# Patient Record
Sex: FEMALE | Race: WHITE | NOT HISPANIC OR LATINO
[De-identification: names, ages, dates, MRNs, and addresses within clinical notes are randomized per-mention and may not be internally consistent; named-entity substitution may affect disease eponyms.]

---

## 2018-03-14 ENCOUNTER — APPOINTMENT (OUTPATIENT)
Dept: INTERNAL MEDICINE | Facility: CLINIC | Age: 68
End: 2018-03-14
Payer: MEDICARE

## 2018-03-14 VITALS
HEIGHT: 61 IN | HEART RATE: 86 BPM | DIASTOLIC BLOOD PRESSURE: 60 MMHG | SYSTOLIC BLOOD PRESSURE: 140 MMHG | RESPIRATION RATE: 14 BRPM | WEIGHT: 177 LBS | TEMPERATURE: 97.7 F | OXYGEN SATURATION: 96 % | BODY MASS INDEX: 33.42 KG/M2

## 2018-03-14 DIAGNOSIS — Z87.09 PERSONAL HISTORY OF OTHER DISEASES OF THE RESPIRATORY SYSTEM: ICD-10-CM

## 2018-03-14 DIAGNOSIS — R73.03 PREDIABETES.: ICD-10-CM

## 2018-03-14 DIAGNOSIS — M79.89 OTHER SPECIFIED SOFT TISSUE DISORDERS: ICD-10-CM

## 2018-03-14 DIAGNOSIS — Z78.9 OTHER SPECIFIED HEALTH STATUS: ICD-10-CM

## 2018-03-14 PROCEDURE — 99204 OFFICE O/P NEW MOD 45 MIN: CPT

## 2018-03-14 RX ORDER — VITAMIN K2 90 MCG
125 MCG CAPSULE ORAL
Refills: 0 | Status: ACTIVE | COMMUNITY

## 2018-03-14 RX ORDER — MULTIVITAMIN
TABLET ORAL
Refills: 0 | Status: ACTIVE | COMMUNITY

## 2018-05-02 ENCOUNTER — RECORD ABSTRACTING (OUTPATIENT)
Age: 68
End: 2018-05-02

## 2018-05-02 DIAGNOSIS — Z86.69 PERSONAL HISTORY OF OTHER DISEASES OF THE NERVOUS SYSTEM AND SENSE ORGANS: ICD-10-CM

## 2018-05-02 DIAGNOSIS — Z87.42 PERSONAL HISTORY OF OTHER DISEASES OF THE FEMALE GENITAL TRACT: ICD-10-CM

## 2018-05-02 DIAGNOSIS — Z87.19 PERSONAL HISTORY OF OTHER DISEASES OF THE DIGESTIVE SYSTEM: ICD-10-CM

## 2018-05-02 DIAGNOSIS — Z80.0 FAMILY HISTORY OF MALIGNANT NEOPLASM OF DIGESTIVE ORGANS: ICD-10-CM

## 2018-05-02 DIAGNOSIS — K46.9 UNSPECIFIED ABDOMINAL HERNIA W/OUT OBSTRUCTION OR GANGRENE: ICD-10-CM

## 2018-05-08 ENCOUNTER — APPOINTMENT (OUTPATIENT)
Dept: GYNECOLOGIC ONCOLOGY | Facility: CLINIC | Age: 68
End: 2018-05-08
Payer: MEDICARE

## 2018-05-08 VITALS
HEART RATE: 85 BPM | WEIGHT: 117 LBS | BODY MASS INDEX: 22.09 KG/M2 | DIASTOLIC BLOOD PRESSURE: 77 MMHG | HEIGHT: 61 IN | SYSTOLIC BLOOD PRESSURE: 127 MMHG | OXYGEN SATURATION: 97 %

## 2018-05-08 DIAGNOSIS — Z80.3 FAMILY HISTORY OF MALIGNANT NEOPLASM OF BREAST: ICD-10-CM

## 2018-05-08 PROCEDURE — 99205 OFFICE O/P NEW HI 60 MIN: CPT

## 2018-05-08 RX ORDER — SENNOSIDES 8.6 MG
TABLET ORAL
Refills: 0 | Status: ACTIVE | COMMUNITY

## 2018-05-21 ENCOUNTER — OUTPATIENT (OUTPATIENT)
Dept: OUTPATIENT SERVICES | Facility: HOSPITAL | Age: 68
LOS: 1 days | End: 2018-05-21
Payer: MEDICARE

## 2018-05-21 ENCOUNTER — RESULT REVIEW (OUTPATIENT)
Age: 68
End: 2018-05-21

## 2018-05-21 DIAGNOSIS — C54.1 MALIGNANT NEOPLASM OF ENDOMETRIUM: ICD-10-CM

## 2018-05-21 PROCEDURE — 88321 CONSLTJ&REPRT SLD PREP ELSWR: CPT

## 2018-05-27 ENCOUNTER — TRANSCRIPTION ENCOUNTER (OUTPATIENT)
Age: 68
End: 2018-05-27

## 2018-05-30 LAB — SURGICAL PATHOLOGY STUDY: SIGNIFICANT CHANGE UP

## 2018-07-05 ENCOUNTER — OUTPATIENT (OUTPATIENT)
Dept: OUTPATIENT SERVICES | Facility: HOSPITAL | Age: 68
LOS: 1 days | End: 2018-07-05
Payer: MEDICARE

## 2018-07-05 ENCOUNTER — APPOINTMENT (OUTPATIENT)
Dept: CT IMAGING | Facility: CLINIC | Age: 68
End: 2018-07-05

## 2018-07-05 DIAGNOSIS — Z00.8 ENCOUNTER FOR OTHER GENERAL EXAMINATION: ICD-10-CM

## 2018-07-05 PROCEDURE — 74177 CT ABD & PELVIS W/CONTRAST: CPT | Mod: 26

## 2018-07-05 PROCEDURE — 74177 CT ABD & PELVIS W/CONTRAST: CPT

## 2018-07-12 ENCOUNTER — RECORD ABSTRACTING (OUTPATIENT)
Age: 68
End: 2018-07-12

## 2018-07-12 DIAGNOSIS — Z86.2 PERSONAL HISTORY OF DISEASES OF THE BLOOD AND BLOOD-FORMING ORGANS AND CERTAIN DISORDERS INVOLVING THE IMMUNE MECHANISM: ICD-10-CM

## 2018-07-12 DIAGNOSIS — Z81.8 FAMILY HISTORY OF OTHER MENTAL AND BEHAVIORAL DISORDERS: ICD-10-CM

## 2018-07-13 ENCOUNTER — APPOINTMENT (OUTPATIENT)
Dept: PEDIATRIC MEDICAL GENETICS | Facility: CLINIC | Age: 68
End: 2018-07-13
Payer: MEDICARE

## 2018-07-13 PROCEDURE — 96040: CPT

## 2018-07-25 ENCOUNTER — RECORD ABSTRACTING (OUTPATIENT)
Age: 68
End: 2018-07-25

## 2018-07-25 ENCOUNTER — APPOINTMENT (OUTPATIENT)
Dept: PULMONOLOGY | Facility: CLINIC | Age: 68
End: 2018-07-25

## 2018-08-06 ENCOUNTER — APPOINTMENT (OUTPATIENT)
Dept: PEDIATRIC MEDICAL GENETICS | Facility: CLINIC | Age: 68
End: 2018-08-06
Payer: MEDICARE

## 2018-08-06 PROCEDURE — 96040: CPT

## 2018-08-15 ENCOUNTER — APPOINTMENT (OUTPATIENT)
Dept: PULMONOLOGY | Facility: CLINIC | Age: 68
End: 2018-08-15
Payer: MEDICARE

## 2018-08-15 VITALS
RESPIRATION RATE: 14 BRPM | HEART RATE: 80 BPM | OXYGEN SATURATION: 94 % | SYSTOLIC BLOOD PRESSURE: 152 MMHG | HEIGHT: 61 IN | DIASTOLIC BLOOD PRESSURE: 79 MMHG

## 2018-08-15 PROCEDURE — 94010 BREATHING CAPACITY TEST: CPT

## 2018-08-15 PROCEDURE — 99213 OFFICE O/P EST LOW 20 MIN: CPT | Mod: 25

## 2018-08-15 PROCEDURE — 95012 NITRIC OXIDE EXP GAS DETER: CPT

## 2018-08-15 RX ORDER — MONTELUKAST SODIUM 10 MG/1
10 TABLET, FILM COATED ORAL
Refills: 0 | Status: DISCONTINUED | COMMUNITY
End: 2018-08-15

## 2018-08-15 RX ORDER — BECLOMETHASONE DIPROPIONATE 80 UG/1
AEROSOL, METERED RESPIRATORY (INHALATION)
Refills: 0 | Status: DISCONTINUED | COMMUNITY
End: 2018-08-15

## 2018-08-28 ENCOUNTER — APPOINTMENT (OUTPATIENT)
Dept: GYNECOLOGIC ONCOLOGY | Facility: CLINIC | Age: 68
End: 2018-08-28
Payer: MEDICARE

## 2018-08-28 ENCOUNTER — RECORD ABSTRACTING (OUTPATIENT)
Age: 68
End: 2018-08-28

## 2018-08-28 VITALS
BODY MASS INDEX: 22.09 KG/M2 | DIASTOLIC BLOOD PRESSURE: 75 MMHG | OXYGEN SATURATION: 96 % | HEART RATE: 78 BPM | HEIGHT: 61 IN | SYSTOLIC BLOOD PRESSURE: 135 MMHG | WEIGHT: 117 LBS

## 2018-08-28 PROCEDURE — 99213 OFFICE O/P EST LOW 20 MIN: CPT

## 2018-08-28 RX ORDER — BECLOMETHASONE DIPROPIONATE HFA 80 UG/1
AEROSOL, METERED RESPIRATORY (INHALATION)
Refills: 0 | Status: DISCONTINUED | COMMUNITY
End: 2018-08-28

## 2018-08-28 RX ORDER — BIOTIN 5 MG
CAPSULE ORAL
Refills: 0 | Status: DISCONTINUED | COMMUNITY
End: 2018-08-28

## 2018-08-28 RX ORDER — OXYCODONE HYDROCHLORIDE AND ACETAMINOPHEN 5; 325 MG/1; MG/1
5-325 TABLET ORAL
Refills: 0 | Status: DISCONTINUED | COMMUNITY
End: 2018-08-28

## 2018-09-25 ENCOUNTER — FORM ENCOUNTER (OUTPATIENT)
Age: 68
End: 2018-09-25

## 2018-09-26 ENCOUNTER — APPOINTMENT (OUTPATIENT)
Dept: MRI IMAGING | Facility: CLINIC | Age: 68
End: 2018-09-26

## 2018-09-26 ENCOUNTER — OUTPATIENT (OUTPATIENT)
Dept: OUTPATIENT SERVICES | Facility: HOSPITAL | Age: 68
LOS: 1 days | End: 2018-09-26
Payer: MEDICARE

## 2018-09-26 DIAGNOSIS — Z00.8 ENCOUNTER FOR OTHER GENERAL EXAMINATION: ICD-10-CM

## 2018-09-26 PROCEDURE — 72148 MRI LUMBAR SPINE W/O DYE: CPT

## 2018-09-26 PROCEDURE — 72148 MRI LUMBAR SPINE W/O DYE: CPT | Mod: 26

## 2018-10-20 ENCOUNTER — OUTPATIENT (OUTPATIENT)
Dept: OUTPATIENT SERVICES | Facility: HOSPITAL | Age: 68
LOS: 1 days | End: 2018-10-20
Payer: MEDICARE

## 2018-10-20 ENCOUNTER — APPOINTMENT (OUTPATIENT)
Dept: CT IMAGING | Facility: CLINIC | Age: 68
End: 2018-10-20
Payer: MEDICARE

## 2018-10-20 DIAGNOSIS — C55 MALIGNANT NEOPLASM OF UTERUS, PART UNSPECIFIED: ICD-10-CM

## 2018-10-20 PROCEDURE — 74177 CT ABD & PELVIS W/CONTRAST: CPT

## 2018-10-20 PROCEDURE — 82565 ASSAY OF CREATININE: CPT

## 2018-10-20 PROCEDURE — 74177 CT ABD & PELVIS W/CONTRAST: CPT | Mod: 26

## 2018-11-11 ENCOUNTER — FORM ENCOUNTER (OUTPATIENT)
Age: 68
End: 2018-11-11

## 2018-11-11 DIAGNOSIS — J45.20 MILD INTERMITTENT ASTHMA, UNCOMPLICATED: ICD-10-CM

## 2018-11-12 ENCOUNTER — APPOINTMENT (OUTPATIENT)
Dept: CT IMAGING | Facility: CLINIC | Age: 68
End: 2018-11-12
Payer: MEDICARE

## 2018-11-12 ENCOUNTER — OUTPATIENT (OUTPATIENT)
Dept: OUTPATIENT SERVICES | Facility: HOSPITAL | Age: 68
LOS: 1 days | End: 2018-11-12
Payer: MEDICARE

## 2018-11-12 DIAGNOSIS — R91.1 SOLITARY PULMONARY NODULE: ICD-10-CM

## 2018-11-12 PROCEDURE — 71250 CT THORAX DX C-: CPT

## 2018-11-12 PROCEDURE — 71250 CT THORAX DX C-: CPT | Mod: 26

## 2018-11-13 PROBLEM — J45.20 ALLERGIC ASTHMA, MILD INTERMITTENT, UNCOMPLICATED: Status: RESOLVED | Noted: 2018-07-12 | Resolved: 2018-11-13

## 2018-11-14 ENCOUNTER — APPOINTMENT (OUTPATIENT)
Dept: PULMONOLOGY | Facility: CLINIC | Age: 68
End: 2018-11-14
Payer: MEDICARE

## 2018-11-14 VITALS
BODY MASS INDEX: 31.72 KG/M2 | DIASTOLIC BLOOD PRESSURE: 71 MMHG | OXYGEN SATURATION: 97 % | HEIGHT: 61 IN | HEART RATE: 88 BPM | SYSTOLIC BLOOD PRESSURE: 147 MMHG | WEIGHT: 168 LBS | RESPIRATION RATE: 14 BRPM

## 2018-11-14 PROCEDURE — 99213 OFFICE O/P EST LOW 20 MIN: CPT | Mod: 25

## 2018-11-14 PROCEDURE — 95012 NITRIC OXIDE EXP GAS DETER: CPT

## 2018-11-14 PROCEDURE — 94010 BREATHING CAPACITY TEST: CPT

## 2018-12-04 ENCOUNTER — APPOINTMENT (OUTPATIENT)
Dept: GYNECOLOGIC ONCOLOGY | Facility: CLINIC | Age: 68
End: 2018-12-04
Payer: MEDICARE

## 2018-12-04 VITALS
BODY MASS INDEX: 31.72 KG/M2 | HEIGHT: 61 IN | WEIGHT: 168 LBS | DIASTOLIC BLOOD PRESSURE: 80 MMHG | SYSTOLIC BLOOD PRESSURE: 126 MMHG | HEART RATE: 76 BPM

## 2018-12-04 DIAGNOSIS — R91.1 SOLITARY PULMONARY NODULE: ICD-10-CM

## 2018-12-04 PROCEDURE — 99213 OFFICE O/P EST LOW 20 MIN: CPT

## 2019-01-02 ENCOUNTER — APPOINTMENT (OUTPATIENT)
Dept: INTERNAL MEDICINE | Facility: CLINIC | Age: 69
End: 2019-01-02
Payer: MEDICARE

## 2019-01-02 ENCOUNTER — LABORATORY RESULT (OUTPATIENT)
Age: 69
End: 2019-01-02

## 2019-01-02 VITALS
TEMPERATURE: 98.2 F | WEIGHT: 166 LBS | BODY MASS INDEX: 31.34 KG/M2 | DIASTOLIC BLOOD PRESSURE: 70 MMHG | RESPIRATION RATE: 14 BRPM | OXYGEN SATURATION: 97 % | SYSTOLIC BLOOD PRESSURE: 130 MMHG | HEART RATE: 86 BPM | HEIGHT: 61 IN

## 2019-01-02 PROCEDURE — G0439: CPT

## 2019-01-02 NOTE — HEALTH RISK ASSESSMENT
[Fair] : ~his/her~ current health as fair  [Good] : ~his/her~  mood as  good [0] : 2) Feeling down, depressed, or hopeless: Not at all (0) [Patient reported mammogram was normal] : Patient reported mammogram was normal [MammogramDate] : 03/18 [ColonoscopyDate] : 01/15 [ColonoscopyComments] : polyp

## 2019-01-02 NOTE — ASSESSMENT
[FreeTextEntry1] : Chronic pain- follow up with pain management\par Asthma- follow up with pulmonary\par HM- UT\par Anxiety- take Lorazepam sparingly

## 2019-01-02 NOTE — HISTORY OF PRESENT ILLNESS
[de-identified] : Pt with chronic pain- followed by pain management\par Pt followed by GynOnc for adenocarcinoma uterine.\par Pt followed by GI for pancreatic cyst\par Pt followed by pulmonary for asthma\par only some slight anxiety- take Lorazepam only sparingly

## 2019-01-04 LAB
25(OH)D3 SERPL-MCNC: 39.8 NG/ML
ALBUMIN SERPL ELPH-MCNC: 4.2 G/DL
ALP BLD-CCNC: 85 U/L
ALT SERPL-CCNC: 12 U/L
ANION GAP SERPL CALC-SCNC: 12 MMOL/L
AST SERPL-CCNC: 20 U/L
BASOPHILS # BLD AUTO: 0.05 K/UL
BASOPHILS NFR BLD AUTO: 0.7 %
BILIRUB SERPL-MCNC: 0.6 MG/DL
BUN SERPL-MCNC: 17 MG/DL
CALCIUM SERPL-MCNC: 9.4 MG/DL
CHLORIDE SERPL-SCNC: 105 MMOL/L
CHOLEST SERPL-MCNC: 181 MG/DL
CHOLEST/HDLC SERPL: 2.7 RATIO
CO2 SERPL-SCNC: 25 MMOL/L
CREAT SERPL-MCNC: 0.67 MG/DL
EOSINOPHIL # BLD AUTO: 0.03 K/UL
EOSINOPHIL NFR BLD AUTO: 0.4 %
FOLATE SERPL-MCNC: >20 NG/ML
GLUCOSE SERPL-MCNC: 96 MG/DL
HBA1C MFR BLD HPLC: 5.7 %
HCT VFR BLD CALC: 36.5 %
HDLC SERPL-MCNC: 67 MG/DL
HGB BLD-MCNC: 11.3 G/DL
IMM GRANULOCYTES NFR BLD AUTO: 0.3 %
LDLC SERPL CALC-MCNC: 97 MG/DL
LYMPHOCYTES # BLD AUTO: 1.82 K/UL
LYMPHOCYTES NFR BLD AUTO: 26.6 %
MAN DIFF?: NORMAL
MCHC RBC-ENTMCNC: 18.7 PG
MCHC RBC-ENTMCNC: 31 GM/DL
MCV RBC AUTO: 60.3 FL
MONOCYTES # BLD AUTO: 0.39 K/UL
MONOCYTES NFR BLD AUTO: 5.7 %
NEUTROPHILS # BLD AUTO: 4.53 K/UL
NEUTROPHILS NFR BLD AUTO: 66.3 %
PLATELET # BLD AUTO: 285 K/UL
POTASSIUM SERPL-SCNC: 4.8 MMOL/L
PROT SERPL-MCNC: 6.9 G/DL
RBC # BLD: 6.05 M/UL
RBC # FLD: 16.4 %
SODIUM SERPL-SCNC: 142 MMOL/L
TRIGL SERPL-MCNC: 85 MG/DL
TSH SERPL-ACNC: 1.7 UIU/ML
VIT B12 SERPL-MCNC: 786 PG/ML
WBC # FLD AUTO: 6.84 K/UL

## 2019-01-07 ENCOUNTER — APPOINTMENT (OUTPATIENT)
Dept: HEMATOLOGY ONCOLOGY | Facility: CLINIC | Age: 69
End: 2019-01-07

## 2019-01-07 LAB
APPEARANCE: ABNORMAL
BACTERIA: ABNORMAL
BILIRUBIN URINE: NEGATIVE
BLOOD URINE: NEGATIVE
COLOR: YELLOW
GLUCOSE QUALITATIVE U: NEGATIVE MG/DL
HYALINE CASTS: 1 /LPF
KETONES URINE: NEGATIVE
LEUKOCYTE ESTERASE URINE: NEGATIVE
MICROSCOPIC-UA: NORMAL
NITRITE URINE: POSITIVE
PH URINE: >8.5
PROTEIN URINE: NEGATIVE MG/DL
RED BLOOD CELLS URINE: 4 /HPF
SPECIFIC GRAVITY URINE: 1.02
SQUAMOUS EPITHELIAL CELLS: 2 /HPF
UROBILINOGEN URINE: NEGATIVE MG/DL
WHITE BLOOD CELLS URINE: 1 /HPF

## 2019-01-12 LAB — HEMOCCULT STL QL IA: NEGATIVE

## 2019-02-13 ENCOUNTER — LABORATORY RESULT (OUTPATIENT)
Age: 69
End: 2019-02-13

## 2019-02-13 ENCOUNTER — APPOINTMENT (OUTPATIENT)
Dept: INTERNAL MEDICINE | Facility: CLINIC | Age: 69
End: 2019-02-13
Payer: MEDICARE

## 2019-02-13 VITALS
OXYGEN SATURATION: 98 % | WEIGHT: 160 LBS | SYSTOLIC BLOOD PRESSURE: 140 MMHG | RESPIRATION RATE: 14 BRPM | BODY MASS INDEX: 30.21 KG/M2 | HEIGHT: 61 IN | HEART RATE: 72 BPM | DIASTOLIC BLOOD PRESSURE: 70 MMHG | TEMPERATURE: 98.3 F

## 2019-02-13 DIAGNOSIS — R63.4 ABNORMAL WEIGHT LOSS: ICD-10-CM

## 2019-02-13 PROCEDURE — 99214 OFFICE O/P EST MOD 30 MIN: CPT

## 2019-02-13 NOTE — ASSESSMENT
[FreeTextEntry1] : anxiety- to see therapist for evaluation\par Weight loss- order MRI of abdomen with and without contrast - follow up with GI

## 2019-02-13 NOTE — HISTORY OF PRESENT ILLNESS
[de-identified] : Pt here today \par Has recently lost 16 pounds weight loss in the past 6 months.\par Pt with poor appetite.\par No back pain\par Pt followed by GI for pancreas cyst. Schedule to get MRI of abdomen in 4/19\par Pt very anxious lately especially worse in the move

## 2019-02-13 NOTE — REVIEW OF SYSTEMS
[Negative] : Heme/Lymph [Suicidal] : not suicidal [Insomnia] : no insomnia [Anxiety] : anxiety [Depression] : depression

## 2019-02-14 ENCOUNTER — APPOINTMENT (OUTPATIENT)
Dept: INTERNAL MEDICINE | Facility: CLINIC | Age: 69
End: 2019-02-14

## 2019-02-14 LAB
ALBUMIN SERPL ELPH-MCNC: 4.5 G/DL
ALP BLD-CCNC: 86 U/L
ALT SERPL-CCNC: 15 U/L
AMYLASE/CREAT SERPL: 41 U/L
ANION GAP SERPL CALC-SCNC: 9 MMOL/L
AST SERPL-CCNC: 17 U/L
BASOPHILS # BLD AUTO: 0.04 K/UL
BASOPHILS NFR BLD AUTO: 0.6 %
BILIRUB SERPL-MCNC: 0.6 MG/DL
BUN SERPL-MCNC: 14 MG/DL
CALCIUM SERPL-MCNC: 9.8 MG/DL
CANCER AG19-9 SERPL-ACNC: 24.2 U/ML
CHLORIDE SERPL-SCNC: 102 MMOL/L
CO2 SERPL-SCNC: 28 MMOL/L
CREAT SERPL-MCNC: 0.67 MG/DL
EOSINOPHIL # BLD AUTO: 0.01 K/UL
EOSINOPHIL NFR BLD AUTO: 0.2 %
GLUCOSE SERPL-MCNC: 100 MG/DL
HCT VFR BLD CALC: 36.1 %
HGB BLD-MCNC: 11.1 G/DL
IMM GRANULOCYTES NFR BLD AUTO: 0.5 %
LPL SERPL-CCNC: 27 U/L
LYMPHOCYTES # BLD AUTO: 1.72 K/UL
LYMPHOCYTES NFR BLD AUTO: 26.2 %
MAN DIFF?: NORMAL
MCHC RBC-ENTMCNC: 18.3 PG
MCHC RBC-ENTMCNC: 30.7 GM/DL
MCV RBC AUTO: 59.6 FL
MONOCYTES # BLD AUTO: 0.38 K/UL
MONOCYTES NFR BLD AUTO: 5.8 %
NEUTROPHILS # BLD AUTO: 4.38 K/UL
NEUTROPHILS NFR BLD AUTO: 66.7 %
PLATELET # BLD AUTO: 275 K/UL
POTASSIUM SERPL-SCNC: 4.8 MMOL/L
PROT SERPL-MCNC: 7.1 G/DL
RBC # BLD: 6.06 M/UL
RBC # FLD: 16.5 %
SODIUM SERPL-SCNC: 139 MMOL/L
WBC # FLD AUTO: 6.56 K/UL

## 2019-02-20 ENCOUNTER — APPOINTMENT (OUTPATIENT)
Dept: PULMONOLOGY | Facility: CLINIC | Age: 69
End: 2019-02-20
Payer: MEDICARE

## 2019-02-20 VITALS
HEART RATE: 60 BPM | OXYGEN SATURATION: 95 % | HEIGHT: 61 IN | DIASTOLIC BLOOD PRESSURE: 77 MMHG | SYSTOLIC BLOOD PRESSURE: 161 MMHG | WEIGHT: 160 LBS | BODY MASS INDEX: 30.21 KG/M2

## 2019-02-20 PROCEDURE — 99213 OFFICE O/P EST LOW 20 MIN: CPT | Mod: 25

## 2019-02-20 PROCEDURE — 95012 NITRIC OXIDE EXP GAS DETER: CPT

## 2019-02-20 RX ORDER — CICLESONIDE 80 UG/1
80 AEROSOL, METERED RESPIRATORY (INHALATION)
Qty: 15 | Refills: 1 | Status: DISCONTINUED | COMMUNITY
Start: 2018-08-15 | End: 2019-02-20

## 2019-02-20 NOTE — ASSESSMENT
[FreeTextEntry1] : Symptomatic asthma medication intolerance. Experiencing side effects from very low doses and very low potency inhaled steroids. Can retry alvesco with spacer. We'll also add anticholinergic as p.r.n.\par \par Patient requesting retrial of montelukast with beta blockers. As side effect was cardiac reluctant to retry these medications. However if cannot control symptoms with inhalers may have no other option

## 2019-02-20 NOTE — HISTORY OF PRESENT ILLNESS
[FreeTextEntry1] : had issues with anxiety\par stopped qvar-claims to be losing weight from it\par reports improvement in anxiety without medication\par

## 2019-02-23 ENCOUNTER — FORM ENCOUNTER (OUTPATIENT)
Age: 69
End: 2019-02-23

## 2019-02-24 ENCOUNTER — APPOINTMENT (OUTPATIENT)
Dept: MRI IMAGING | Facility: CLINIC | Age: 69
End: 2019-02-24
Payer: MEDICARE

## 2019-02-24 ENCOUNTER — OUTPATIENT (OUTPATIENT)
Dept: OUTPATIENT SERVICES | Facility: HOSPITAL | Age: 69
LOS: 1 days | End: 2019-02-24
Payer: MEDICARE

## 2019-02-24 DIAGNOSIS — R63.4 ABNORMAL WEIGHT LOSS: ICD-10-CM

## 2019-02-24 PROCEDURE — 74183 MRI ABD W/O CNTR FLWD CNTR: CPT | Mod: 26

## 2019-02-24 PROCEDURE — 72197 MRI PELVIS W/O & W/DYE: CPT | Mod: 26

## 2019-02-24 PROCEDURE — A9585: CPT

## 2019-02-24 PROCEDURE — 72197 MRI PELVIS W/O & W/DYE: CPT

## 2019-02-24 PROCEDURE — 74183 MRI ABD W/O CNTR FLWD CNTR: CPT

## 2019-03-02 ENCOUNTER — TRANSCRIPTION ENCOUNTER (OUTPATIENT)
Age: 69
End: 2019-03-02

## 2019-03-04 ENCOUNTER — TRANSCRIPTION ENCOUNTER (OUTPATIENT)
Age: 69
End: 2019-03-04

## 2019-03-07 RX ORDER — SERTRALINE HYDROCHLORIDE 50 MG/1
50 TABLET, FILM COATED ORAL
Qty: 30 | Refills: 2 | Status: DISCONTINUED | COMMUNITY
Start: 2019-03-01 | End: 2019-03-07

## 2019-03-12 ENCOUNTER — TRANSCRIPTION ENCOUNTER (OUTPATIENT)
Age: 69
End: 2019-03-12

## 2019-03-12 ENCOUNTER — APPOINTMENT (OUTPATIENT)
Dept: GYNECOLOGIC ONCOLOGY | Facility: CLINIC | Age: 69
End: 2019-03-12
Payer: MEDICARE

## 2019-03-12 VITALS
DIASTOLIC BLOOD PRESSURE: 82 MMHG | BODY MASS INDEX: 30.21 KG/M2 | HEIGHT: 61 IN | WEIGHT: 160 LBS | SYSTOLIC BLOOD PRESSURE: 134 MMHG

## 2019-03-12 PROCEDURE — 99213 OFFICE O/P EST LOW 20 MIN: CPT

## 2019-03-13 ENCOUNTER — APPOINTMENT (OUTPATIENT)
Dept: INTERNAL MEDICINE | Facility: CLINIC | Age: 69
End: 2019-03-13

## 2019-03-14 ENCOUNTER — TRANSCRIPTION ENCOUNTER (OUTPATIENT)
Age: 69
End: 2019-03-14

## 2019-03-15 ENCOUNTER — TRANSCRIPTION ENCOUNTER (OUTPATIENT)
Age: 69
End: 2019-03-15

## 2019-03-25 ENCOUNTER — TRANSCRIPTION ENCOUNTER (OUTPATIENT)
Age: 69
End: 2019-03-25

## 2019-03-26 ENCOUNTER — APPOINTMENT (OUTPATIENT)
Dept: INTERNAL MEDICINE | Facility: CLINIC | Age: 69
End: 2019-03-26
Payer: MEDICARE

## 2019-03-26 VITALS
WEIGHT: 160 LBS | RESPIRATION RATE: 14 BRPM | HEART RATE: 90 BPM | TEMPERATURE: 98.7 F | HEIGHT: 61 IN | BODY MASS INDEX: 30.21 KG/M2 | DIASTOLIC BLOOD PRESSURE: 80 MMHG | OXYGEN SATURATION: 98 % | SYSTOLIC BLOOD PRESSURE: 140 MMHG

## 2019-03-26 PROCEDURE — 99213 OFFICE O/P EST LOW 20 MIN: CPT

## 2019-03-26 NOTE — HISTORY OF PRESENT ILLNESS
[de-identified] : Pt here today for blood pressure check.\par Pt with more panic recently because dog \par Pt with blood pressure readings 110-140/80\par Saw GI this am for pancreatic cyst\par Pt referred to urology for lesion on kidney with patient

## 2019-03-26 NOTE — ASSESSMENT
[FreeTextEntry1] : Borderline BP- recheck next week\par Stressed diet and exercise.\par Recheck next week.

## 2019-04-02 ENCOUNTER — APPOINTMENT (OUTPATIENT)
Dept: INTERNAL MEDICINE | Facility: CLINIC | Age: 69
End: 2019-04-02
Payer: MEDICARE

## 2019-04-02 VITALS
SYSTOLIC BLOOD PRESSURE: 150 MMHG | OXYGEN SATURATION: 96 % | RESPIRATION RATE: 14 BRPM | DIASTOLIC BLOOD PRESSURE: 70 MMHG | HEART RATE: 86 BPM | HEIGHT: 61 IN | WEIGHT: 165 LBS | BODY MASS INDEX: 31.15 KG/M2 | TEMPERATURE: 98.2 F

## 2019-04-02 VITALS — SYSTOLIC BLOOD PRESSURE: 132 MMHG | DIASTOLIC BLOOD PRESSURE: 80 MMHG

## 2019-04-02 PROCEDURE — 99213 OFFICE O/P EST LOW 20 MIN: CPT

## 2019-04-02 NOTE — HISTORY OF PRESENT ILLNESS
[de-identified] : Pt here today for blood pressure check.\par Pt monitoring blood pressure- only periodically elevated.\par

## 2019-04-11 ENCOUNTER — APPOINTMENT (OUTPATIENT)
Dept: CARDIOLOGY | Facility: CLINIC | Age: 69
End: 2019-04-11
Payer: MEDICARE

## 2019-04-11 ENCOUNTER — NON-APPOINTMENT (OUTPATIENT)
Age: 69
End: 2019-04-11

## 2019-04-11 VITALS
BODY MASS INDEX: 30.58 KG/M2 | HEIGHT: 61 IN | OXYGEN SATURATION: 97 % | DIASTOLIC BLOOD PRESSURE: 84 MMHG | SYSTOLIC BLOOD PRESSURE: 135 MMHG | HEART RATE: 69 BPM | WEIGHT: 162 LBS

## 2019-04-11 PROCEDURE — 99204 OFFICE O/P NEW MOD 45 MIN: CPT

## 2019-04-11 PROCEDURE — 93000 ELECTROCARDIOGRAM COMPLETE: CPT

## 2019-04-11 NOTE — HISTORY OF PRESENT ILLNESS
[FreeTextEntry1] : Ms. Chang is a 69 year old female here for initial evaluation.\par \par Cardiac-wise, she has a history of premature ventricular contractions and ventricular bigeminy. This usually occurs in the setting of steroid based asthma medications, which she is currently off of. In the past, she has been on Inderal when taking her medications.\par \par Recently, her blood pressures have been more elevated than usual. While she was on her asthma medications, her SBP was near the 140 range. Over the last month, her bps seem to be better controlled, except for a spike after playing poker.\par \par Overall, she is feeling well. She denies chest pain, difficulty breathing, and significant palpitations. She also denies lower trimmings swelling, orthopnea, PND, dizziness, lightheadedness and near syncope. She has seen a cardiologist in [place] in the past, and will have the records faxed to our office.

## 2019-04-11 NOTE — DISCUSSION/SUMMARY
[___ Month(s)] : [unfilled] month(s) [FreeTextEntry1] : Ms. Chang is a 69 year old female here for initial evaluation. \par \par It seems that she has a history of situational hypertension. She has previously been on Inderal, in the setting of premature ventricular contractions and bigeminy.\par \par Her blood pressure is better controlled today. Her EKG is a SR without obvious ischemia or chamber enlargement.  I will hold off on starting any medications at this time. If her PVCs continue, and her blood pressure remains elevated, I will add a beta blocker to her regimen.\par \par She will continue to monitor her blood pressure at home. I have asked that she fax her most recent records from her cardiologist in [place] to our office. I will then decide if any further testing is necessary.\par \par I have stressed the importance of diet, and exercise in detail. She knows to call if any issues or concerns. She will see me in 2 months time, after being evaluated by her pulmonologist.\par \par

## 2019-04-11 NOTE — PHYSICAL EXAM
[Normal Appearance] : normal appearance [General Appearance - Well Developed] : well developed [General Appearance - Well Nourished] : well nourished [Well Groomed] : well groomed [No Deformities] : no deformities [Eyelids - No Xanthelasma] : the eyelids demonstrated no xanthelasmas [Normal Conjunctiva] : the conjunctiva exhibited no abnormalities [General Appearance - In No Acute Distress] : no acute distress [Normal Oral Mucosa] : normal oral mucosa [No Oral Cyanosis] : no oral cyanosis [No Oral Pallor] : no oral pallor [Normal Jugular Venous A Waves Present] : normal jugular venous A waves present [Normal Jugular Venous V Waves Present] : normal jugular venous V waves present [No Jugular Venous Dumont A Waves] : no jugular venous dumont A waves [Normal Rate] : normal [Normal S1] : normal S1 [Normal S2] : normal S2 [No Murmur] : no murmurs heard [2+] : left 2+ [No Abnormalities] : the abdominal aorta was not enlarged and no bruit was heard [No Pitting Edema] : no pitting edema present [Respiration, Rhythm And Depth] : normal respiratory rhythm and effort [Exaggerated Use Of Accessory Muscles For Inspiration] : no accessory muscle use [Auscultation Breath Sounds / Voice Sounds] : lungs were clear to auscultation bilaterally [Abdomen Tenderness] : non-tender [Abdomen Soft] : soft [Abdomen Mass (___ Cm)] : no abdominal mass palpated [Abnormal Walk] : normal gait [Nail Clubbing] : no clubbing of the fingernails [Gait - Sufficient For Exercise Testing] : the gait was sufficient for exercise testing [Cyanosis, Localized] : no localized cyanosis [Petechial Hemorrhages (___cm)] : no petechial hemorrhages [Skin Color & Pigmentation] : normal skin color and pigmentation [No Venous Stasis] : no venous stasis [] : no rash [Skin Lesions] : no skin lesions [Oriented To Time, Place, And Person] : oriented to person, place, and time [No Skin Ulcers] : no skin ulcer [No Xanthoma] : no  xanthoma was observed [Affect] : the affect was normal [Mood] : the mood was normal [No Anxiety] : not feeling anxious [S3] : no S3 [S4] : no S4 [Right Carotid Bruit] : no bruit heard over the right carotid [Left Carotid Bruit] : no bruit heard over the left carotid [Right Femoral Bruit] : no bruit heard over the right femoral artery [Left Femoral Bruit] : no bruit heard over the left femoral artery

## 2019-04-30 ENCOUNTER — FORM ENCOUNTER (OUTPATIENT)
Age: 69
End: 2019-04-30

## 2019-05-01 ENCOUNTER — OUTPATIENT (OUTPATIENT)
Dept: OUTPATIENT SERVICES | Facility: HOSPITAL | Age: 69
LOS: 1 days | End: 2019-05-01
Payer: MEDICARE

## 2019-05-01 ENCOUNTER — APPOINTMENT (OUTPATIENT)
Dept: MAMMOGRAPHY | Facility: CLINIC | Age: 69
End: 2019-05-01
Payer: MEDICARE

## 2019-05-01 DIAGNOSIS — Z00.8 ENCOUNTER FOR OTHER GENERAL EXAMINATION: ICD-10-CM

## 2019-05-01 PROCEDURE — 77063 BREAST TOMOSYNTHESIS BI: CPT | Mod: 26

## 2019-05-01 PROCEDURE — 77067 SCR MAMMO BI INCL CAD: CPT

## 2019-05-01 PROCEDURE — 76641 ULTRASOUND BREAST COMPLETE: CPT

## 2019-05-01 PROCEDURE — 77063 BREAST TOMOSYNTHESIS BI: CPT

## 2019-05-01 PROCEDURE — 76641 ULTRASOUND BREAST COMPLETE: CPT | Mod: 26,50

## 2019-05-01 PROCEDURE — 77067 SCR MAMMO BI INCL CAD: CPT | Mod: 26

## 2019-05-06 ENCOUNTER — TRANSCRIPTION ENCOUNTER (OUTPATIENT)
Age: 69
End: 2019-05-06

## 2019-05-22 ENCOUNTER — APPOINTMENT (OUTPATIENT)
Dept: PULMONOLOGY | Facility: CLINIC | Age: 69
End: 2019-05-22
Payer: MEDICARE

## 2019-05-22 VITALS
SYSTOLIC BLOOD PRESSURE: 165 MMHG | DIASTOLIC BLOOD PRESSURE: 87 MMHG | OXYGEN SATURATION: 96 % | HEIGHT: 61 IN | BODY MASS INDEX: 30.78 KG/M2 | WEIGHT: 163 LBS | RESPIRATION RATE: 14 BRPM | HEART RATE: 77 BPM

## 2019-05-22 PROCEDURE — 99214 OFFICE O/P EST MOD 30 MIN: CPT

## 2019-05-22 RX ORDER — IPRATROPIUM BROMIDE 17 UG/1
17 AEROSOL, METERED RESPIRATORY (INHALATION)
Qty: 1 | Refills: 5 | Status: DISCONTINUED | COMMUNITY
Start: 2019-02-20 | End: 2019-05-22

## 2019-05-22 RX ORDER — MONTELUKAST SODIUM 5 MG/1
5 TABLET, CHEWABLE ORAL DAILY
Qty: 90 | Refills: 3 | Status: ACTIVE | COMMUNITY
Start: 2019-05-22 | End: 1900-01-01

## 2019-05-22 NOTE — ASSESSMENT
[FreeTextEntry1] : No objective benefit from inhaled steroids with many symptomatic complaints while on them. Will discontinue. Will retry montelukast as this was effective for her in the past. Suggest using half dose given history of palpitations with full dose. Recommendation to followup with cardiology if palpitations occur.\par \par Had discussion with patient about issue of pulmonary nodules. She was upset that I told her that the nodules didn't need followup. I clarified and told her that the nodules do not need independent followup other than for the ongoing followup regarding her cancer. Therefore I am deferring the scheduling of these followup scans to oncology. I told her that I will be happy to review the scans with her when they are available

## 2019-05-22 NOTE — HISTORY OF PRESENT ILLNESS
[FreeTextEntry1] : did not tolerate any inhaled steroids-off qvar, alvesco. atrovent gave her palpitiations\par Saw cardiology.

## 2019-05-23 ENCOUNTER — OUTPATIENT (OUTPATIENT)
Dept: OUTPATIENT SERVICES | Facility: HOSPITAL | Age: 69
LOS: 1 days | End: 2019-05-23
Payer: MEDICARE

## 2019-05-23 ENCOUNTER — APPOINTMENT (OUTPATIENT)
Dept: CT IMAGING | Facility: CLINIC | Age: 69
End: 2019-05-23
Payer: MEDICARE

## 2019-05-23 DIAGNOSIS — Z00.8 ENCOUNTER FOR OTHER GENERAL EXAMINATION: ICD-10-CM

## 2019-05-23 PROCEDURE — 82565 ASSAY OF CREATININE: CPT

## 2019-05-23 PROCEDURE — 71260 CT THORAX DX C+: CPT | Mod: 26

## 2019-05-23 PROCEDURE — 71260 CT THORAX DX C+: CPT

## 2019-06-19 ENCOUNTER — APPOINTMENT (OUTPATIENT)
Dept: CT IMAGING | Facility: CLINIC | Age: 69
End: 2019-06-19

## 2019-06-25 ENCOUNTER — APPOINTMENT (OUTPATIENT)
Dept: GYNECOLOGIC ONCOLOGY | Facility: CLINIC | Age: 69
End: 2019-06-25
Payer: MEDICARE

## 2019-06-25 VITALS
SYSTOLIC BLOOD PRESSURE: 138 MMHG | BODY MASS INDEX: 31.15 KG/M2 | DIASTOLIC BLOOD PRESSURE: 82 MMHG | HEIGHT: 61 IN | WEIGHT: 165 LBS

## 2019-06-25 PROCEDURE — 99213 OFFICE O/P EST LOW 20 MIN: CPT

## 2019-07-17 ENCOUNTER — APPOINTMENT (OUTPATIENT)
Dept: INTERNAL MEDICINE | Facility: CLINIC | Age: 69
End: 2019-07-17
Payer: MEDICARE

## 2019-07-17 VITALS
SYSTOLIC BLOOD PRESSURE: 156 MMHG | TEMPERATURE: 98.2 F | HEART RATE: 107 BPM | WEIGHT: 163 LBS | BODY MASS INDEX: 30.78 KG/M2 | RESPIRATION RATE: 14 BRPM | HEIGHT: 61 IN | OXYGEN SATURATION: 98 % | DIASTOLIC BLOOD PRESSURE: 74 MMHG

## 2019-07-17 VITALS — SYSTOLIC BLOOD PRESSURE: 130 MMHG | DIASTOLIC BLOOD PRESSURE: 74 MMHG

## 2019-07-17 DIAGNOSIS — Z91.018 ALLERGY TO OTHER FOODS: ICD-10-CM

## 2019-07-17 PROCEDURE — 99214 OFFICE O/P EST MOD 30 MIN: CPT

## 2019-07-17 NOTE — ASSESSMENT
[FreeTextEntry1] : anxiety- stable off all Meds\par Asthma- stable off Meds. Follow up with pulmonary. Pt to get repeat CT of chest 1/20\par Endometrial CA- follow up GYN. Scheduled for CT of ab/ pelvis.

## 2019-07-17 NOTE — HISTORY OF PRESENT ILLNESS
[de-identified] : pt here today for follow up. \par Blood pressure at home 120/80. \par Pt anxiety under good control.\par Pt seeing pulmonary for asthma- Pt only Ventolin only as needed.

## 2019-07-24 ENCOUNTER — APPOINTMENT (OUTPATIENT)
Dept: CT IMAGING | Facility: CLINIC | Age: 69
End: 2019-07-24

## 2019-07-24 ENCOUNTER — OUTPATIENT (OUTPATIENT)
Dept: OUTPATIENT SERVICES | Facility: HOSPITAL | Age: 69
LOS: 1 days | End: 2019-07-24
Payer: MEDICARE

## 2019-07-24 DIAGNOSIS — Z00.8 ENCOUNTER FOR OTHER GENERAL EXAMINATION: ICD-10-CM

## 2019-07-24 PROCEDURE — 74177 CT ABD & PELVIS W/CONTRAST: CPT

## 2019-07-24 PROCEDURE — 74177 CT ABD & PELVIS W/CONTRAST: CPT | Mod: 26

## 2019-07-24 PROCEDURE — 82565 ASSAY OF CREATININE: CPT

## 2019-08-16 LAB
25(OH)D3 SERPL-MCNC: 41.7 NG/ML
ALBUMIN SERPL ELPH-MCNC: 4.6 G/DL
ALP BLD-CCNC: 93 U/L
ALT SERPL-CCNC: 16 U/L
ANION GAP SERPL CALC-SCNC: 11 MMOL/L
APPEARANCE: CLEAR
AST SERPL-CCNC: 25 U/L
BACTERIA: NEGATIVE
BASOPHILS # BLD AUTO: 0.06 K/UL
BASOPHILS NFR BLD AUTO: 0.8 %
BILIRUB SERPL-MCNC: 0.6 MG/DL
BILIRUBIN URINE: NEGATIVE
BLOOD URINE: NEGATIVE
BUN SERPL-MCNC: 14 MG/DL
CALCIUM SERPL-MCNC: 9.7 MG/DL
CHLORIDE SERPL-SCNC: 105 MMOL/L
CHOLEST SERPL-MCNC: 198 MG/DL
CHOLEST/HDLC SERPL: 2.7 RATIO
CO2 SERPL-SCNC: 27 MMOL/L
COLOR: YELLOW
CREAT SERPL-MCNC: 0.67 MG/DL
EOSINOPHIL # BLD AUTO: 0.01 K/UL
EOSINOPHIL NFR BLD AUTO: 0.1 %
ESTIMATED AVERAGE GLUCOSE: 111 MG/DL
FOLATE SERPL-MCNC: 18.2 NG/ML
GLUCOSE QUALITATIVE U: NEGATIVE
GLUCOSE SERPL-MCNC: 101 MG/DL
HBA1C MFR BLD HPLC: 5.5 %
HCT VFR BLD CALC: 40.4 %
HDLC SERPL-MCNC: 73 MG/DL
HGB BLD-MCNC: 11.8 G/DL
HYALINE CASTS: 2 /LPF
IMM GRANULOCYTES NFR BLD AUTO: 0.3 %
KETONES URINE: NEGATIVE
LDLC SERPL CALC-MCNC: 105 MG/DL
LEUKOCYTE ESTERASE URINE: NEGATIVE
LYMPHOCYTES # BLD AUTO: 1.75 K/UL
LYMPHOCYTES NFR BLD AUTO: 24.6 %
MAN DIFF?: NORMAL
MCHC RBC-ENTMCNC: 18.6 PG
MCHC RBC-ENTMCNC: 29.2 GM/DL
MCV RBC AUTO: 63.7 FL
MICROSCOPIC-UA: NORMAL
MONOCYTES # BLD AUTO: 0.42 K/UL
MONOCYTES NFR BLD AUTO: 5.9 %
NEUTROPHILS # BLD AUTO: 4.85 K/UL
NEUTROPHILS NFR BLD AUTO: 68.3 %
NITRITE URINE: NEGATIVE
PH URINE: 7.5
PLATELET # BLD AUTO: 293 K/UL
POTASSIUM SERPL-SCNC: 5.1 MMOL/L
PROT SERPL-MCNC: 7 G/DL
PROTEIN URINE: NORMAL
RBC # BLD: 6.34 M/UL
RBC # FLD: 18.7 %
RED BLOOD CELLS URINE: 5 /HPF
SODIUM SERPL-SCNC: 143 MMOL/L
SPECIFIC GRAVITY URINE: 1.02
SQUAMOUS EPITHELIAL CELLS: 3 /HPF
TRIGL SERPL-MCNC: 100 MG/DL
TSH SERPL-ACNC: 1.28 UIU/ML
UROBILINOGEN URINE: NORMAL
VIT B12 SERPL-MCNC: 661 PG/ML
WBC # FLD AUTO: 7.11 K/UL
WHITE BLOOD CELLS URINE: 2 /HPF

## 2019-11-05 ENCOUNTER — NON-APPOINTMENT (OUTPATIENT)
Age: 69
End: 2019-11-05

## 2019-11-05 ENCOUNTER — APPOINTMENT (OUTPATIENT)
Dept: INTERNAL MEDICINE | Facility: CLINIC | Age: 69
End: 2019-11-05
Payer: MEDICARE

## 2019-11-05 VITALS
HEIGHT: 61 IN | SYSTOLIC BLOOD PRESSURE: 120 MMHG | RESPIRATION RATE: 14 BRPM | DIASTOLIC BLOOD PRESSURE: 70 MMHG | HEART RATE: 73 BPM | TEMPERATURE: 98.3 F | BODY MASS INDEX: 31.72 KG/M2 | WEIGHT: 168 LBS | OXYGEN SATURATION: 98 %

## 2019-11-05 DIAGNOSIS — R03.0 ELEVATED BLOOD-PRESSURE READING, W/OUT DIAGNOSIS OF HYPERTENSION: ICD-10-CM

## 2019-11-05 PROCEDURE — 99214 OFFICE O/P EST MOD 30 MIN: CPT | Mod: 25

## 2019-11-05 PROCEDURE — 93000 ELECTROCARDIOGRAM COMPLETE: CPT

## 2019-11-06 LAB
ALBUMIN SERPL ELPH-MCNC: 4.4 G/DL
ALP BLD-CCNC: 95 U/L
ALT SERPL-CCNC: 19 U/L
ANION GAP SERPL CALC-SCNC: 13 MMOL/L
AST SERPL-CCNC: 21 U/L
BASOPHILS # BLD AUTO: 0.05 K/UL
BASOPHILS NFR BLD AUTO: 0.6 %
BILIRUB SERPL-MCNC: 0.7 MG/DL
BUN SERPL-MCNC: 14 MG/DL
CALCIUM SERPL-MCNC: 9.9 MG/DL
CHLORIDE SERPL-SCNC: 103 MMOL/L
CO2 SERPL-SCNC: 26 MMOL/L
CREAT SERPL-MCNC: 0.72 MG/DL
EOSINOPHIL # BLD AUTO: 0.03 K/UL
EOSINOPHIL NFR BLD AUTO: 0.4 %
GLUCOSE SERPL-MCNC: 96 MG/DL
HCT VFR BLD CALC: 39 %
HGB BLD-MCNC: 11.6 G/DL
IMM GRANULOCYTES NFR BLD AUTO: 0.6 %
LYMPHOCYTES # BLD AUTO: 2.11 K/UL
LYMPHOCYTES NFR BLD AUTO: 26 %
MAN DIFF?: NORMAL
MCHC RBC-ENTMCNC: 18.2 PG
MCHC RBC-ENTMCNC: 29.7 GM/DL
MCV RBC AUTO: 61.2 FL
MONOCYTES # BLD AUTO: 0.56 K/UL
MONOCYTES NFR BLD AUTO: 6.9 %
NEUTROPHILS # BLD AUTO: 5.33 K/UL
NEUTROPHILS NFR BLD AUTO: 65.5 %
PLATELET # BLD AUTO: 289 K/UL
POTASSIUM SERPL-SCNC: 5.5 MMOL/L
PROT SERPL-MCNC: 7 G/DL
RBC # BLD: 6.37 M/UL
RBC # FLD: 17.7 %
SODIUM SERPL-SCNC: 142 MMOL/L
WBC # FLD AUTO: 8.13 K/UL

## 2019-11-06 NOTE — HISTORY OF PRESENT ILLNESS
[No Pertinent Cardiac History] : no history of aortic stenosis, atrial fibrillation, coronary artery disease, recent myocardial infarction, or implantable device/pacemaker [FreeTextEntry1] : Blepharoplasty [FreeTextEntry2] : November 21,2019 [FreeTextEntry3] : Dr. Botello [FreeTextEntry4] : Good exercise tolerance

## 2019-11-12 ENCOUNTER — APPOINTMENT (OUTPATIENT)
Dept: INTERNAL MEDICINE | Facility: CLINIC | Age: 69
End: 2019-11-12

## 2019-11-19 ENCOUNTER — APPOINTMENT (OUTPATIENT)
Dept: GYNECOLOGIC ONCOLOGY | Facility: CLINIC | Age: 69
End: 2019-11-19
Payer: MEDICARE

## 2019-11-19 VITALS
SYSTOLIC BLOOD PRESSURE: 128 MMHG | BODY MASS INDEX: 32.1 KG/M2 | HEIGHT: 61 IN | WEIGHT: 170 LBS | DIASTOLIC BLOOD PRESSURE: 76 MMHG

## 2019-11-19 PROCEDURE — 99213 OFFICE O/P EST LOW 20 MIN: CPT

## 2019-11-19 RX ORDER — TRAMADOL HYDROCHLORIDE 25 MG/1
TABLET, COATED ORAL
Refills: 0 | Status: DISCONTINUED | COMMUNITY
End: 2019-11-19

## 2019-12-04 ENCOUNTER — APPOINTMENT (OUTPATIENT)
Dept: PULMONOLOGY | Facility: CLINIC | Age: 69
End: 2019-12-04

## 2019-12-05 ENCOUNTER — APPOINTMENT (OUTPATIENT)
Dept: INTERNAL MEDICINE | Facility: CLINIC | Age: 69
End: 2019-12-05
Payer: MEDICARE

## 2019-12-05 VITALS — DIASTOLIC BLOOD PRESSURE: 80 MMHG | SYSTOLIC BLOOD PRESSURE: 132 MMHG

## 2019-12-05 VITALS
SYSTOLIC BLOOD PRESSURE: 150 MMHG | BODY MASS INDEX: 32.1 KG/M2 | OXYGEN SATURATION: 96 % | HEIGHT: 61 IN | HEART RATE: 75 BPM | WEIGHT: 170 LBS | DIASTOLIC BLOOD PRESSURE: 82 MMHG | RESPIRATION RATE: 14 BRPM | TEMPERATURE: 98.3 F

## 2019-12-05 DIAGNOSIS — Z11.1 ENCOUNTER FOR SCREENING FOR RESPIRATORY TUBERCULOSIS: ICD-10-CM

## 2019-12-05 PROCEDURE — 99214 OFFICE O/P EST MOD 30 MIN: CPT | Mod: 25

## 2019-12-05 PROCEDURE — 36415 COLL VENOUS BLD VENIPUNCTURE: CPT

## 2019-12-05 NOTE — PHYSICAL EXAM
[No Acute Distress] : no acute distress [Well Nourished] : well nourished [Well Developed] : well developed [Well-Appearing] : well-appearing [Normal Sclera/Conjunctiva] : normal sclera/conjunctiva [PERRL] : pupils equal round and reactive to light [EOMI] : extraocular movements intact [Normal Outer Ear/Nose] : the outer ears and nose were normal in appearance [Normal Oropharynx] : the oropharynx was normal [No JVD] : no jugular venous distention [No Lymphadenopathy] : no lymphadenopathy [Supple] : supple [Thyroid Normal, No Nodules] : the thyroid was normal and there were no nodules present [No Respiratory Distress] : no respiratory distress  [No Accessory Muscle Use] : no accessory muscle use [Normal Rate] : normal rate  [Clear to Auscultation] : lungs were clear to auscultation bilaterally [Regular Rhythm] : with a regular rhythm [Normal S1, S2] : normal S1 and S2 [No Murmur] : no murmur heard [No Carotid Bruits] : no carotid bruits [No Abdominal Bruit] : a ~M bruit was not heard ~T in the abdomen [No Varicosities] : no varicosities [Pedal Pulses Present] : the pedal pulses are present [No Edema] : there was no peripheral edema [No Extremity Clubbing/Cyanosis] : no extremity clubbing/cyanosis [No Palpable Aorta] : no palpable aorta [Soft] : abdomen soft [Non Tender] : non-tender [Non-distended] : non-distended [No HSM] : no HSM [No Masses] : no abdominal mass palpated [Normal Bowel Sounds] : normal bowel sounds [Normal Posterior Cervical Nodes] : no posterior cervical lymphadenopathy [Normal Anterior Cervical Nodes] : no anterior cervical lymphadenopathy [No CVA Tenderness] : no CVA  tenderness [No Spinal Tenderness] : no spinal tenderness [No Joint Swelling] : no joint swelling [Grossly Normal Strength/Tone] : grossly normal strength/tone [No Rash] : no rash [No Focal Deficits] : no focal deficits [Coordination Grossly Intact] : coordination grossly intact [Normal Gait] : normal gait [Deep Tendon Reflexes (DTR)] : deep tendon reflexes were 2+ and symmetric [Normal Affect] : the affect was normal [Normal Insight/Judgement] : insight and judgment were intact

## 2019-12-05 NOTE — ASSESSMENT
[FreeTextEntry1] : Asthma- stable and followed by pulmonary. Follow up CT in 2/20\par HCM- check labs today. MMR titers\par Endo CA- follow up with gyn- and get PET in 7/20

## 2019-12-05 NOTE — HISTORY OF PRESENT ILLNESS
[de-identified] : Pt here today for blood pressure check\par Pt doing well.\par Blood pressure at home 120/80

## 2019-12-06 LAB
MEV IGG FLD QL IA: <5 AU/ML
MEV IGG+IGM SER-IMP: NEGATIVE
MUV AB SER-ACNC: POSITIVE
MUV IGG SER QL IA: >300 AU/ML
RUBV IGG FLD-ACNC: 30.4 INDEX
RUBV IGG SER-IMP: POSITIVE
VZV AB TITR SER: POSITIVE
VZV IGG SER IF-ACNC: 2129 INDEX

## 2019-12-07 LAB
M TB IFN-G BLD-IMP: NEGATIVE
QUANTIFERON TB PLUS MITOGEN MINUS NIL: 1.69 IU/ML
QUANTIFERON TB PLUS NIL: 0.02 IU/ML
QUANTIFERON TB PLUS TB1 MINUS NIL: 0 IU/ML
QUANTIFERON TB PLUS TB2 MINUS NIL: 0 IU/ML

## 2019-12-26 ENCOUNTER — APPOINTMENT (OUTPATIENT)
Dept: INTERNAL MEDICINE | Facility: CLINIC | Age: 69
End: 2019-12-26
Payer: MEDICARE

## 2019-12-26 VITALS
WEIGHT: 170 LBS | HEIGHT: 61 IN | OXYGEN SATURATION: 97 % | RESPIRATION RATE: 14 BRPM | DIASTOLIC BLOOD PRESSURE: 72 MMHG | SYSTOLIC BLOOD PRESSURE: 134 MMHG | BODY MASS INDEX: 32.1 KG/M2 | TEMPERATURE: 97.6 F | HEART RATE: 92 BPM

## 2019-12-26 DIAGNOSIS — Z87.440 PERSONAL HISTORY OF URINARY (TRACT) INFECTIONS: ICD-10-CM

## 2019-12-26 PROCEDURE — 99214 OFFICE O/P EST MOD 30 MIN: CPT

## 2019-12-26 NOTE — PHYSICAL EXAM
[Well Nourished] : well nourished [Well Developed] : well developed [No Acute Distress] : no acute distress [Normal Sclera/Conjunctiva] : normal sclera/conjunctiva [Well-Appearing] : well-appearing [PERRL] : pupils equal round and reactive to light [EOMI] : extraocular movements intact [Normal Oropharynx] : the oropharynx was normal [Normal Outer Ear/Nose] : the outer ears and nose were normal in appearance [Supple] : supple [No Lymphadenopathy] : no lymphadenopathy [No JVD] : no jugular venous distention [No Accessory Muscle Use] : no accessory muscle use [Thyroid Normal, No Nodules] : the thyroid was normal and there were no nodules present [No Respiratory Distress] : no respiratory distress  [Normal Rate] : normal rate  [Clear to Auscultation] : lungs were clear to auscultation bilaterally [Regular Rhythm] : with a regular rhythm [Normal S1, S2] : normal S1 and S2 [No Murmur] : no murmur heard [No Varicosities] : no varicosities [No Carotid Bruits] : no carotid bruits [No Abdominal Bruit] : a ~M bruit was not heard ~T in the abdomen [No Palpable Aorta] : no palpable aorta [No Edema] : there was no peripheral edema [Pedal Pulses Present] : the pedal pulses are present [Soft] : abdomen soft [Non Tender] : non-tender [No Extremity Clubbing/Cyanosis] : no extremity clubbing/cyanosis [No Masses] : no abdominal mass palpated [No HSM] : no HSM [Non-distended] : non-distended [Normal Posterior Cervical Nodes] : no posterior cervical lymphadenopathy [Normal Anterior Cervical Nodes] : no anterior cervical lymphadenopathy [Normal Bowel Sounds] : normal bowel sounds [No CVA Tenderness] : no CVA  tenderness [No Joint Swelling] : no joint swelling [No Spinal Tenderness] : no spinal tenderness [Coordination Grossly Intact] : coordination grossly intact [No Rash] : no rash [Grossly Normal Strength/Tone] : grossly normal strength/tone [Deep Tendon Reflexes (DTR)] : deep tendon reflexes were 2+ and symmetric [Normal Gait] : normal gait [No Focal Deficits] : no focal deficits [Normal Insight/Judgement] : insight and judgment were intact [Normal Affect] : the affect was normal

## 2019-12-26 NOTE — HISTORY OF PRESENT ILLNESS
[FreeTextEntry8] : pt here today for UTI symptoms\par Pt with burning with urination\par Symptoms for 3 days\par No temps

## 2019-12-26 NOTE — REVIEW OF SYSTEMS
[Dysuria] : dysuria [Nocturia] : no nocturia [Hematuria] : no hematuria [Incontinence] : no incontinence [Vaginal Discharge] : vaginal discharge [Frequency] : frequency [Negative] : Heme/Lymph

## 2019-12-28 ENCOUNTER — TRANSCRIPTION ENCOUNTER (OUTPATIENT)
Age: 69
End: 2019-12-28

## 2019-12-31 ENCOUNTER — TRANSCRIPTION ENCOUNTER (OUTPATIENT)
Age: 69
End: 2019-12-31

## 2019-12-31 LAB
APPEARANCE: CLEAR
BACTERIA UR CULT: ABNORMAL
BACTERIA: ABNORMAL
BILIRUBIN URINE: ABNORMAL
BLOOD URINE: ABNORMAL
COLOR: ABNORMAL
GLUCOSE QUALITATIVE U: ABNORMAL
HYALINE CASTS: 0 /LPF
KETONES URINE: NEGATIVE
LEUKOCYTE ESTERASE URINE: ABNORMAL
MICROSCOPIC-UA: NORMAL
NITRITE URINE: POSITIVE
PH URINE: 5
PROTEIN URINE: ABNORMAL
RED BLOOD CELLS URINE: 53 /HPF
SPECIFIC GRAVITY URINE: 1.02
SQUAMOUS EPITHELIAL CELLS: 10 /HPF
UROBILINOGEN URINE: ABNORMAL
WHITE BLOOD CELLS URINE: >720 /HPF

## 2020-01-02 ENCOUNTER — TRANSCRIPTION ENCOUNTER (OUTPATIENT)
Age: 70
End: 2020-01-02

## 2020-01-13 ENCOUNTER — LABORATORY RESULT (OUTPATIENT)
Age: 70
End: 2020-01-13

## 2020-01-14 LAB
25(OH)D3 SERPL-MCNC: 47.7 NG/ML
ALBUMIN SERPL ELPH-MCNC: 4.4 G/DL
ALP BLD-CCNC: 90 U/L
ALT SERPL-CCNC: 20 U/L
ANION GAP SERPL CALC-SCNC: 14 MMOL/L
APPEARANCE: CLEAR
AST SERPL-CCNC: 24 U/L
BACTERIA: NEGATIVE
BASOPHILS # BLD AUTO: 0.06 K/UL
BASOPHILS NFR BLD AUTO: 0.8 %
BILIRUB SERPL-MCNC: 0.6 MG/DL
BILIRUBIN URINE: NEGATIVE
BLOOD URINE: NEGATIVE
BUN SERPL-MCNC: 17 MG/DL
CALCIUM SERPL-MCNC: 9.7 MG/DL
CHLORIDE SERPL-SCNC: 102 MMOL/L
CHOLEST SERPL-MCNC: 201 MG/DL
CHOLEST/HDLC SERPL: 2.8 RATIO
CO2 SERPL-SCNC: 26 MMOL/L
COLOR: NORMAL
CREAT SERPL-MCNC: 0.72 MG/DL
EOSINOPHIL # BLD AUTO: 0.03 K/UL
EOSINOPHIL NFR BLD AUTO: 0.4 %
ESTIMATED AVERAGE GLUCOSE: 111 MG/DL
FOLATE SERPL-MCNC: 9.3 NG/ML
GLUCOSE QUALITATIVE U: NEGATIVE
GLUCOSE SERPL-MCNC: 96 MG/DL
HBA1C MFR BLD HPLC: 5.5 %
HCT VFR BLD CALC: 37.5 %
HDLC SERPL-MCNC: 72 MG/DL
HGB BLD-MCNC: 11.1 G/DL
HYALINE CASTS: 2 /LPF
IMM GRANULOCYTES NFR BLD AUTO: 0.7 %
KETONES URINE: NEGATIVE
LDLC SERPL CALC-MCNC: 110 MG/DL
LEUKOCYTE ESTERASE URINE: NEGATIVE
LYMPHOCYTES # BLD AUTO: 2.08 K/UL
LYMPHOCYTES NFR BLD AUTO: 28.2 %
MAN DIFF?: NORMAL
MCHC RBC-ENTMCNC: 18.5 PG
MCHC RBC-ENTMCNC: 29.6 GM/DL
MCV RBC AUTO: 62.6 FL
MICROSCOPIC-UA: NORMAL
MONOCYTES # BLD AUTO: 0.49 K/UL
MONOCYTES NFR BLD AUTO: 6.6 %
NEUTROPHILS # BLD AUTO: 4.66 K/UL
NEUTROPHILS NFR BLD AUTO: 63.3 %
NITRITE URINE: NEGATIVE
PH URINE: 7
PLATELET # BLD AUTO: 291 K/UL
POTASSIUM SERPL-SCNC: 5.1 MMOL/L
PROT SERPL-MCNC: 6.7 G/DL
PROTEIN URINE: NEGATIVE
RBC # BLD: 5.99 M/UL
RBC # FLD: 18.6 %
RED BLOOD CELLS URINE: 2 /HPF
SODIUM SERPL-SCNC: 142 MMOL/L
SPECIFIC GRAVITY URINE: 1.01
SQUAMOUS EPITHELIAL CELLS: 1 /HPF
TRIGL SERPL-MCNC: 96 MG/DL
TSH SERPL-ACNC: 1.96 UIU/ML
UROBILINOGEN URINE: NORMAL
VIT B12 SERPL-MCNC: 664 PG/ML
WBC # FLD AUTO: 7.37 K/UL
WHITE BLOOD CELLS URINE: 1 /HPF

## 2020-01-17 ENCOUNTER — APPOINTMENT (OUTPATIENT)
Dept: INTERNAL MEDICINE | Facility: CLINIC | Age: 70
End: 2020-01-17
Payer: MEDICARE

## 2020-01-17 VITALS
SYSTOLIC BLOOD PRESSURE: 130 MMHG | RESPIRATION RATE: 14 BRPM | TEMPERATURE: 98.4 F | DIASTOLIC BLOOD PRESSURE: 70 MMHG | BODY MASS INDEX: 31.91 KG/M2 | HEIGHT: 61 IN | OXYGEN SATURATION: 97 % | WEIGHT: 169 LBS | HEART RATE: 82 BPM

## 2020-01-17 DIAGNOSIS — Z23 ENCOUNTER FOR IMMUNIZATION: ICD-10-CM

## 2020-01-17 PROCEDURE — G0439: CPT

## 2020-01-17 PROCEDURE — 90707 MMR VACCINE SC: CPT | Mod: GY

## 2020-01-17 PROCEDURE — 90471 IMMUNIZATION ADMIN: CPT

## 2020-01-17 NOTE — HEALTH RISK ASSESSMENT
[No falls in past year] : Patient reported no falls in the past year [Good] : ~his/her~  mood as  good [Patient reported mammogram was normal] : Patient reported mammogram was normal [MammogramDate] : 05/19

## 2020-01-17 NOTE — ASSESSMENT
[FreeTextEntry1] : anxiety- doing well off meds.\par MMR today ( wants to volunteer)\par all blood reviewed and ekg done 11/19\par 2/2020- CT of lungs to follow up nodules\par History of uterine ca- follow up gyn onc\par

## 2020-01-22 ENCOUNTER — TRANSCRIPTION ENCOUNTER (OUTPATIENT)
Age: 70
End: 2020-01-22

## 2020-01-28 ENCOUNTER — TRANSCRIPTION ENCOUNTER (OUTPATIENT)
Age: 70
End: 2020-01-28

## 2020-01-29 ENCOUNTER — LABORATORY RESULT (OUTPATIENT)
Age: 70
End: 2020-01-29

## 2020-01-29 ENCOUNTER — APPOINTMENT (OUTPATIENT)
Dept: INTERNAL MEDICINE | Facility: CLINIC | Age: 70
End: 2020-01-29
Payer: MEDICARE

## 2020-01-29 VITALS
RESPIRATION RATE: 14 BRPM | WEIGHT: 170 LBS | HEART RATE: 92 BPM | SYSTOLIC BLOOD PRESSURE: 152 MMHG | DIASTOLIC BLOOD PRESSURE: 84 MMHG | BODY MASS INDEX: 32.1 KG/M2 | OXYGEN SATURATION: 98 % | TEMPERATURE: 99.4 F | HEIGHT: 61 IN

## 2020-01-29 DIAGNOSIS — R50.9 FEVER, UNSPECIFIED: ICD-10-CM

## 2020-01-29 LAB
FLUAV SPEC QL CULT: NORMAL
FLUBV AG SPEC QL IA: NORMAL

## 2020-01-29 PROCEDURE — 87804 INFLUENZA ASSAY W/OPTIC: CPT | Mod: 59,QW

## 2020-01-29 PROCEDURE — 99213 OFFICE O/P EST LOW 20 MIN: CPT

## 2020-01-29 NOTE — PHYSICAL EXAM
[No Acute Distress] : no acute distress [Well Nourished] : well nourished [Well Developed] : well developed [Well-Appearing] : well-appearing [Normal Sclera/Conjunctiva] : normal sclera/conjunctiva [PERRL] : pupils equal round and reactive to light [EOMI] : extraocular movements intact [Normal Outer Ear/Nose] : the outer ears and nose were normal in appearance [Normal Oropharynx] : the oropharynx was normal [No JVD] : no jugular venous distention [No Lymphadenopathy] : no lymphadenopathy [Supple] : supple [Thyroid Normal, No Nodules] : the thyroid was normal and there were no nodules present [No Respiratory Distress] : no respiratory distress  [No Accessory Muscle Use] : no accessory muscle use [Clear to Auscultation] : lungs were clear to auscultation bilaterally [Normal Rate] : normal rate  [Regular Rhythm] : with a regular rhythm [No Murmur] : no murmur heard [Normal S1, S2] : normal S1 and S2 [No Carotid Bruits] : no carotid bruits [No Abdominal Bruit] : a ~M bruit was not heard ~T in the abdomen [No Varicosities] : no varicosities [Pedal Pulses Present] : the pedal pulses are present [No Edema] : there was no peripheral edema [No Palpable Aorta] : no palpable aorta [No Extremity Clubbing/Cyanosis] : no extremity clubbing/cyanosis [Non Tender] : non-tender [Soft] : abdomen soft [Non-distended] : non-distended [No HSM] : no HSM [No Masses] : no abdominal mass palpated [Normal Posterior Cervical Nodes] : no posterior cervical lymphadenopathy [Normal Bowel Sounds] : normal bowel sounds [Normal Anterior Cervical Nodes] : no anterior cervical lymphadenopathy [No CVA Tenderness] : no CVA  tenderness [No Joint Swelling] : no joint swelling [No Spinal Tenderness] : no spinal tenderness [Coordination Grossly Intact] : coordination grossly intact [No Rash] : no rash [Grossly Normal Strength/Tone] : grossly normal strength/tone [Deep Tendon Reflexes (DTR)] : deep tendon reflexes were 2+ and symmetric [No Focal Deficits] : no focal deficits [Normal Gait] : normal gait [Normal Affect] : the affect was normal [Normal Insight/Judgement] : insight and judgment were intact

## 2020-01-29 NOTE — ASSESSMENT
[FreeTextEntry1] : Fever- could be reaction vs viral URI.  Check labs and urine. Temp for 2 days now and return if symptoms worsen or persist.

## 2020-01-29 NOTE — REVIEW OF SYSTEMS
[Fever] : fever [Chills] : chills [Fatigue] : fatigue [Joint Pain] : joint pain [Muscle Pain] : muscle pain [Negative] : Heme/Lymph

## 2020-01-29 NOTE — HISTORY OF PRESENT ILLNESS
[FreeTextEntry8] : Pt here today with intermittant joint pain, rash and temperature. Tmax 102.5\par Symptoms started after 72 hour after MMR. SYmptoms improved after 3 days and then returned.\par This am did not take tylenol or advil\par No cough\par No congestion\par No gu complains\par some naueas.\par

## 2020-01-30 LAB
ALBUMIN SERPL ELPH-MCNC: 4.5 G/DL
ALP BLD-CCNC: 87 U/L
ALT SERPL-CCNC: 15 U/L
ANION GAP SERPL CALC-SCNC: 11 MMOL/L
APPEARANCE: CLEAR
AST SERPL-CCNC: 24 U/L
BACTERIA UR CULT: NORMAL
BASOPHILS # BLD AUTO: 0.03 K/UL
BASOPHILS NFR BLD AUTO: 0.8 %
BILIRUB SERPL-MCNC: 0.4 MG/DL
BILIRUBIN URINE: NEGATIVE
BLOOD URINE: NORMAL
BUN SERPL-MCNC: 12 MG/DL
CALCIUM SERPL-MCNC: 9.3 MG/DL
CHLORIDE SERPL-SCNC: 101 MMOL/L
CO2 SERPL-SCNC: 25 MMOL/L
COLOR: YELLOW
CREAT SERPL-MCNC: 0.7 MG/DL
EOSINOPHIL # BLD AUTO: 0.01 K/UL
EOSINOPHIL NFR BLD AUTO: 0.3 %
GLUCOSE QUALITATIVE U: NEGATIVE
GLUCOSE SERPL-MCNC: 101 MG/DL
HCT VFR BLD CALC: 37.6 %
HGB BLD-MCNC: 11.2 G/DL
IMM GRANULOCYTES NFR BLD AUTO: 0.3 %
KETONES URINE: NEGATIVE
LEUKOCYTE ESTERASE URINE: NEGATIVE
LYMPHOCYTES # BLD AUTO: 0.73 K/UL
LYMPHOCYTES NFR BLD AUTO: 18.3 %
MAN DIFF?: NORMAL
MCHC RBC-ENTMCNC: 18.3 PG
MCHC RBC-ENTMCNC: 29.8 GM/DL
MCV RBC AUTO: 61.3 FL
MONOCYTES # BLD AUTO: 0.41 K/UL
MONOCYTES NFR BLD AUTO: 10.3 %
NEUTROPHILS # BLD AUTO: 2.81 K/UL
NEUTROPHILS NFR BLD AUTO: 70 %
NITRITE URINE: NEGATIVE
PH URINE: 6
PLATELET # BLD AUTO: 213 K/UL
POTASSIUM SERPL-SCNC: 4.3 MMOL/L
PROT SERPL-MCNC: 6.8 G/DL
PROTEIN URINE: ABNORMAL
RBC # BLD: 6.13 M/UL
RBC # FLD: 17.9 %
SODIUM SERPL-SCNC: 138 MMOL/L
SPECIFIC GRAVITY URINE: 1.03
UROBILINOGEN URINE: NORMAL
WBC # FLD AUTO: 4 K/UL

## 2020-02-03 LAB — BACTERIA BLD CULT: NORMAL

## 2020-02-04 ENCOUNTER — TRANSCRIPTION ENCOUNTER (OUTPATIENT)
Age: 70
End: 2020-02-04

## 2020-05-02 ENCOUNTER — TRANSCRIPTION ENCOUNTER (OUTPATIENT)
Age: 70
End: 2020-05-02

## 2020-05-26 ENCOUNTER — APPOINTMENT (OUTPATIENT)
Dept: GYNECOLOGIC ONCOLOGY | Facility: CLINIC | Age: 70
End: 2020-05-26

## 2020-05-26 ENCOUNTER — RECORD ABSTRACTING (OUTPATIENT)
Age: 70
End: 2020-05-26

## 2020-05-28 ENCOUNTER — TRANSCRIPTION ENCOUNTER (OUTPATIENT)
Age: 70
End: 2020-05-28

## 2020-07-24 ENCOUNTER — APPOINTMENT (OUTPATIENT)
Dept: INTERNAL MEDICINE | Facility: CLINIC | Age: 70
End: 2020-07-24
Payer: MEDICARE

## 2020-07-24 VITALS — DIASTOLIC BLOOD PRESSURE: 60 MMHG | SYSTOLIC BLOOD PRESSURE: 117 MMHG

## 2020-07-24 VITALS — BODY MASS INDEX: 31.74 KG/M2 | WEIGHT: 168 LBS

## 2020-07-24 DIAGNOSIS — G47.00 INSOMNIA, UNSPECIFIED: ICD-10-CM

## 2020-07-24 DIAGNOSIS — M25.569 PAIN IN UNSPECIFIED KNEE: ICD-10-CM

## 2020-07-24 DIAGNOSIS — G89.29 PAIN IN UNSPECIFIED KNEE: ICD-10-CM

## 2020-07-24 PROCEDURE — 99213 OFFICE O/P EST LOW 20 MIN: CPT | Mod: 95

## 2020-07-24 RX ORDER — OXYCODONE HYDROCHLORIDE AND ACETAMINOPHEN 7.5; 325 MG/1; MG/1
7.5-325 TABLET ORAL
Refills: 0 | Status: DISCONTINUED | COMMUNITY
End: 2020-07-24

## 2020-07-24 NOTE — ASSESSMENT
[FreeTextEntry1] : Insomnia- doing well on melatonin\par Chronic knee pain- follow up with pain management\par Seafood allergy- EpiPen PRN\par Lung nodules- scheduled for pet scan by gyn onc and follow up pulmonary\par time spent 15 minutes

## 2020-08-11 ENCOUNTER — APPOINTMENT (OUTPATIENT)
Dept: GYNECOLOGIC ONCOLOGY | Facility: CLINIC | Age: 70
End: 2020-08-11
Payer: MEDICARE

## 2020-08-11 VITALS
HEIGHT: 61 IN | DIASTOLIC BLOOD PRESSURE: 72 MMHG | WEIGHT: 169 LBS | BODY MASS INDEX: 31.91 KG/M2 | SYSTOLIC BLOOD PRESSURE: 128 MMHG

## 2020-08-11 DIAGNOSIS — R10.84 GENERALIZED ABDOMINAL PAIN: ICD-10-CM

## 2020-08-11 PROCEDURE — 99213 OFFICE O/P EST LOW 20 MIN: CPT

## 2020-08-27 ENCOUNTER — RESULT REVIEW (OUTPATIENT)
Age: 70
End: 2020-08-27

## 2020-08-27 ENCOUNTER — APPOINTMENT (OUTPATIENT)
Dept: MAMMOGRAPHY | Facility: CLINIC | Age: 70
End: 2020-08-27
Payer: MEDICARE

## 2020-08-27 ENCOUNTER — OUTPATIENT (OUTPATIENT)
Dept: OUTPATIENT SERVICES | Facility: HOSPITAL | Age: 70
LOS: 1 days | End: 2020-08-27
Payer: MEDICARE

## 2020-08-27 ENCOUNTER — APPOINTMENT (OUTPATIENT)
Dept: ULTRASOUND IMAGING | Facility: CLINIC | Age: 70
End: 2020-08-27
Payer: MEDICARE

## 2020-08-27 DIAGNOSIS — Z00.8 ENCOUNTER FOR OTHER GENERAL EXAMINATION: ICD-10-CM

## 2020-08-27 PROCEDURE — 76641 ULTRASOUND BREAST COMPLETE: CPT

## 2020-08-27 PROCEDURE — 77063 BREAST TOMOSYNTHESIS BI: CPT

## 2020-08-27 PROCEDURE — 77063 BREAST TOMOSYNTHESIS BI: CPT | Mod: 26

## 2020-08-27 PROCEDURE — 77067 SCR MAMMO BI INCL CAD: CPT | Mod: 26

## 2020-08-27 PROCEDURE — 77067 SCR MAMMO BI INCL CAD: CPT

## 2020-08-27 PROCEDURE — 76641 ULTRASOUND BREAST COMPLETE: CPT | Mod: 26,50

## 2020-08-28 ENCOUNTER — APPOINTMENT (OUTPATIENT)
Dept: NUCLEAR MEDICINE | Facility: CLINIC | Age: 70
End: 2020-08-28
Payer: MEDICARE

## 2020-08-28 ENCOUNTER — OUTPATIENT (OUTPATIENT)
Dept: OUTPATIENT SERVICES | Facility: HOSPITAL | Age: 70
LOS: 1 days | End: 2020-08-28
Payer: MEDICARE

## 2020-08-28 DIAGNOSIS — R10.84 GENERALIZED ABDOMINAL PAIN: ICD-10-CM

## 2020-08-28 DIAGNOSIS — R91.8 OTHER NONSPECIFIC ABNORMAL FINDING OF LUNG FIELD: ICD-10-CM

## 2020-08-28 DIAGNOSIS — C55 MALIGNANT NEOPLASM OF UTERUS, PART UNSPECIFIED: ICD-10-CM

## 2020-08-28 PROCEDURE — A9552: CPT

## 2020-08-28 PROCEDURE — 78815 PET IMAGE W/CT SKULL-THIGH: CPT

## 2020-08-28 PROCEDURE — 78815 PET IMAGE W/CT SKULL-THIGH: CPT | Mod: 26,PS

## 2020-09-03 NOTE — HISTORY OF PRESENT ILLNESS
[FreeTextEntry1] : Ms. Chang has a h/o stage II endometrial cancer and returns today for followup. Surgery was performed in [place] and she transferred her care postoperatively when she moved to [place].\par \par History:\par In 2016 she presented with PMB. \par 12/21/2016 endometrial biopsy- grade 1 adenocarcinoma of the endometrium with focal villoglandular differentiation. Cervix biopsy- adenocarcinoma involving the surface squamoglandular mucosa. \par Comment- specimen does not show any definitive cervical stromal involvement by adenocarcinoma. \par \par 1/4/2017- laparoscopic hysterectomy and bilateral salpingo-oophorectomy (Dr. Combs- [place]); there was no node dissection. There was extensive ASIF due to prior surgery.\par Pathology: endometrioid adenocarcinoma FIGO grade 1 with villoglandular features. The carcinoma superficially invaded the myometrium. Depth of myometrial invasion was less then 50%. Negative for lymphovascular invasion. Carcinoma extensively involved the endocervical glands but no definite stromal invasion was identified. Peritoneal cytology was benign. (clinical stage IA FIGO grade 1); \par [facility] PATH REVIEW indicated cervical stromal invasion, c/w STAGE II disease.\par \par 1/27/17 CT C/A/P (postop): no pulm nodules noted in report. no adenopathy. stable hepatic masses c/w hemangiomata, left renal angiomyolipoma.\par 4/2017- completed vaginal brachytherapy (Dr. Manolo Del Real -[place]); vaginal dilator prescribed - she uses it 1x/week.\par \par 3/9/2018- She saw Dr. Zaragoza as a new patient. A pelvic US was ordered. Uterus absent. Vaginal cuff appeared unremarkable. \par \par 5/8/18 Initial consultation here.\par 7/5/18 CT A/P w/contrast for extent of disease workup: Indeterminate nodular hypodensities in liver up to 1.5cm; 3mm pancreatic tail hypodensity, left renal angiomyolipoma, no adenopathy or evidence recurrent disease. \par 9/26/18 MRI lumbar spine - [facility]\par CT A/P 10/20/18: unchanged hepatic/pancreatic findings, unchanged renal findings; no ascites, no adenopathy. ?New small bibasilar lung nodules since 7/5/18\par CT chest 11/12/18: bilateral few <4mm lung nodules are unchanged from October scan; no comment re: whether these are new from July scan; patient was seen by pulm and no followup is recommended. \par MRI A/P w/ and w/o contrast 2/24/19 (ordered due to pancreatic cyst and 20#weight loss): pancreas normal, previously-seen 3mm lesion is poorly visualized; no adenopathy; scan is [facility]AIL[facility]. \par (She was planning to have an endoscopic US for further evaluation of pancreatic findings (positive family history), but this was not felt to be necessary after review of prior images.  was normal 2/2019. \par CT chest 5/23/19: minimal increase in size of 3 pulm nodules - saw Dr. Ramirez then Dr. Aiken; plan is f/u non-contrast chest CT in 6 months. She is currently due for chest followup.\par 7/24/19 - CT A/P w/PO and IV contrast- without any evidence of abdominal or pelvic recurrence. 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 allergy. She has been advised not to have IV contrast again.\par \par She has occasional abdominal pain; otherwise feels very well and denies any VB or other associated signs or symptoms. \par \par HM:\par Pap- 1/26/2018- negative, HPV not performed (preop) - now n/a\par Mammogram- 5/1/19 negative ([facility])- has script; per GYN\par Colonoscopy- 6/2018; 5 benign polyps removed \par \par Referred by- Dr. Zaragoza\par PCP- Dr. Juan\par GI - Dr. Alda Moraes; Dr. Wale Hernandez (pancreas)\par Pulmonary- Dr. Ramirez\par Radiation oncologist: Dr. Manolo Del Real

## 2020-09-03 NOTE — LETTER BODY
[FreeTextEntry2] : Her PET/CT demonstrated evidence of metastatic disease in the lungs, and further workup is underway. She will see you annually for gynecologic health maintenance. Thank you again for referring this bharat patient.  [FreeTextEntry1] : PET/CT

## 2020-09-03 NOTE — PHYSICAL EXAM
[de-identified] : well-healed [de-identified] : cuff intact, no lesions [de-identified] : no mass or tenderness

## 2020-09-04 ENCOUNTER — APPOINTMENT (OUTPATIENT)
Dept: THORACIC SURGERY | Facility: CLINIC | Age: 70
End: 2020-09-04
Payer: MEDICARE

## 2020-09-04 VITALS
HEIGHT: 61 IN | WEIGHT: 168 LBS | BODY MASS INDEX: 31.72 KG/M2 | RESPIRATION RATE: 17 BRPM | DIASTOLIC BLOOD PRESSURE: 85 MMHG | TEMPERATURE: 98.7 F | HEART RATE: 75 BPM | OXYGEN SATURATION: 97 % | SYSTOLIC BLOOD PRESSURE: 123 MMHG

## 2020-09-04 DIAGNOSIS — Z87.891 PERSONAL HISTORY OF NICOTINE DEPENDENCE: ICD-10-CM

## 2020-09-04 PROCEDURE — 99205 OFFICE O/P NEW HI 60 MIN: CPT

## 2020-09-04 RX ORDER — NITROFURANTOIN (MONOHYDRATE/MACROCRYSTALS) 25; 75 MG/1; MG/1
100 CAPSULE ORAL
Qty: 14 | Refills: 0 | Status: COMPLETED | COMMUNITY
Start: 2019-12-26 | End: 2020-09-04

## 2020-09-04 RX ORDER — CLINDAMYCIN HYDROCHLORIDE 300 MG/1
300 CAPSULE ORAL
Qty: 10 | Refills: 2 | Status: COMPLETED | COMMUNITY
Start: 2019-11-05 | End: 2020-09-04

## 2020-09-04 RX ORDER — ONDANSETRON 4 MG/1
4 TABLET ORAL EVERY 6 HOURS
Qty: 30 | Refills: 1 | Status: COMPLETED | COMMUNITY
Start: 2019-03-14 | End: 2020-09-04

## 2020-09-04 RX ORDER — CHLORHEXIDINE GLUCONATE 4 %
250 LIQUID (ML) TOPICAL
Refills: 0 | Status: COMPLETED | COMMUNITY
End: 2020-09-04

## 2020-09-04 RX ORDER — PHENAZOPYRIDINE 100 MG/1
100 TABLET, FILM COATED ORAL 3 TIMES DAILY
Qty: 12 | Refills: 0 | Status: COMPLETED | COMMUNITY
Start: 2019-12-26 | End: 2020-09-04

## 2020-09-04 RX ORDER — METHOCARBAMOL 750 MG/1
750 TABLET, FILM COATED ORAL
Refills: 0 | Status: COMPLETED | COMMUNITY
End: 2020-09-04

## 2020-09-04 RX ORDER — LORAZEPAM 0.5 MG/1
0.5 TABLET ORAL
Qty: 60 | Refills: 0 | Status: COMPLETED | COMMUNITY
Start: 2019-01-02 | End: 2020-09-04

## 2020-09-04 RX ORDER — SULFAMETHOXAZOLE AND TRIMETHOPRIM 800; 160 MG/1; MG/1
800-160 TABLET ORAL
Qty: 10 | Refills: 0 | Status: COMPLETED | COMMUNITY
Start: 2019-12-28 | End: 2020-09-04

## 2020-09-08 ENCOUNTER — NON-APPOINTMENT (OUTPATIENT)
Age: 70
End: 2020-09-08

## 2020-09-09 ENCOUNTER — TRANSCRIPTION ENCOUNTER (OUTPATIENT)
Age: 70
End: 2020-09-09

## 2020-09-10 ENCOUNTER — LABORATORY RESULT (OUTPATIENT)
Age: 70
End: 2020-09-10

## 2020-09-10 ENCOUNTER — APPOINTMENT (OUTPATIENT)
Dept: INTERNAL MEDICINE | Facility: CLINIC | Age: 70
End: 2020-09-10
Payer: MEDICARE

## 2020-09-10 ENCOUNTER — NON-APPOINTMENT (OUTPATIENT)
Age: 70
End: 2020-09-10

## 2020-09-10 VITALS
WEIGHT: 170 LBS | HEART RATE: 83 BPM | RESPIRATION RATE: 14 BRPM | OXYGEN SATURATION: 98 % | TEMPERATURE: 97.3 F | SYSTOLIC BLOOD PRESSURE: 150 MMHG | BODY MASS INDEX: 32.12 KG/M2 | DIASTOLIC BLOOD PRESSURE: 84 MMHG

## 2020-09-10 VITALS — DIASTOLIC BLOOD PRESSURE: 82 MMHG | SYSTOLIC BLOOD PRESSURE: 130 MMHG

## 2020-09-10 DIAGNOSIS — Z01.818 ENCOUNTER FOR OTHER PREPROCEDURAL EXAMINATION: ICD-10-CM

## 2020-09-10 LAB
25(OH)D3 SERPL-MCNC: 55.9 NG/ML
APPEARANCE: CLEAR
APTT BLD: 31 SEC
BASOPHILS # BLD AUTO: 0.08 K/UL
BASOPHILS NFR BLD AUTO: 0.9 %
BILIRUBIN URINE: NEGATIVE
BLOOD URINE: NEGATIVE
COLOR: NORMAL
EOSINOPHIL # BLD AUTO: 0.03 K/UL
EOSINOPHIL NFR BLD AUTO: 0.3 %
GLUCOSE QUALITATIVE U: NEGATIVE
HCT VFR BLD CALC: 37.4 %
HGB BLD-MCNC: 11.4 G/DL
IMM GRANULOCYTES NFR BLD AUTO: 0.6 %
INR PPP: 0.98 RATIO
KETONES URINE: NEGATIVE
LEUKOCYTE ESTERASE URINE: ABNORMAL
LYMPHOCYTES # BLD AUTO: 2.26 K/UL
LYMPHOCYTES NFR BLD AUTO: 26.1 %
MAN DIFF?: NORMAL
MCHC RBC-ENTMCNC: 18.6 PG
MCHC RBC-ENTMCNC: 30.5 GM/DL
MCV RBC AUTO: 61.1 FL
MONOCYTES # BLD AUTO: 0.54 K/UL
MONOCYTES NFR BLD AUTO: 6.2 %
NEUTROPHILS # BLD AUTO: 5.7 K/UL
NEUTROPHILS NFR BLD AUTO: 65.9 %
NITRITE URINE: NEGATIVE
PH URINE: 6.5
PLATELET # BLD AUTO: 307 K/UL
PROTEIN URINE: NEGATIVE
PT BLD: 11.7 SEC
RBC # BLD: 6.12 M/UL
RBC # FLD: 18.2 %
SPECIFIC GRAVITY URINE: 1.02
TSH SERPL-ACNC: 2.08 UIU/ML
UROBILINOGEN URINE: NORMAL
WBC # FLD AUTO: 8.66 K/UL

## 2020-09-10 PROCEDURE — 93000 ELECTROCARDIOGRAM COMPLETE: CPT

## 2020-09-10 PROCEDURE — 99214 OFFICE O/P EST MOD 30 MIN: CPT | Mod: 25

## 2020-09-10 NOTE — HISTORY OF PRESENT ILLNESS
[No Pertinent Cardiac History] : no history of aortic stenosis, atrial fibrillation, coronary artery disease, recent myocardial infarction, or implantable device/pacemaker [Asthma] : asthma [No Adverse Anesthesia Reaction] : no adverse anesthesia reaction in self or family member [FreeTextEntry2] : Sept 17. 2020 [FreeTextEntry1] : Wedge Procedure [FreeTextEntry3] : [place] [FreeTextEntry4] : Good exercise tolerance\par History of asthma- followed by pulmonary- stable

## 2020-09-10 NOTE — ASSESSMENT
[FreeTextEntry1] : Ms. SHEELA GARCIA, 70 year old female, former smoker, w/ hx of asthma, Stage II endometrioid adenocarcinoma of uterus s/p Laparoscopic hysterectomy and bilateral salpingo-oophorectomy in 01/2017, and completed vaginal brachytherapy in 04/2017, PVCs, anxiety, RA, thalassemia minor, who was found to have lung nodules on surveillance CT A/P on 10/2018, and CT scan every 6 months to monitor lung nodules.  \par \par PET/CT on 08/28/2020:\par -  New and increased bilateral pulmonary nodules for reference right upper lobe nodule measuring 1 cm (SUV 6.0; image 90) is new and left upper lobe nodule 0.9 cm (SUV 9.2; image 94) previously measured 0.5 cm on CT chest May 23, 2019. FDG avid bilateral pulmonary nodules which are new since CT chest May 23, 2019 were not present on January 27, 2017, and remain suspicious for metastatic disease. \par - Focal asymmetric FDG avidity in the left vallecula/base of tongue (SUV 6.2; image 43). - Asymmetric FDG avidity left base of tongue/vallecula may have been present on PET/CT January 27, 2017 however is more conspicuous, and remains indeterminate. Clinical correlation and direct inspection suggested. \par \par Of note, Patient was seen by dentist yesterday regarding PET/CT findings of the tongue, patient was referred to oral surgeon for f/u. \par \par I have reviewed the patient's medical records and diagnostic images at time of this office consultation and have made the following recommendation:\par 1. PET/CT reviewed and explained to patient, bilateral lung nodules, I recommended a Right VATS, multiple wedge resection. Risks and benefits and alternatives explained to patient, all questions answered, patient agreed to proceed with surgery.\par 2. Medical clearance and PST\par 3. No PFTs needed for surgery. \par 4. Refer patient to Dr. Seven Vazquez (ENT). \par \par I personally performed the services described in the documentation, reviewed the documentation recorded by the scribe in my presence and it accurately and completely records my words and actions.\par \par I, Ester Hagan, NP, am scribing for and the presence of LASHELL Clayton, the following sections HISTORY OF PRESENT ILLNESS, PAST MEDICAL/FAMILY/SOCIAL HISTORY; REVIEW OF SYSTEMS; VITAL SIGNS; PHYSICAL EXAM; DISPOSITION.

## 2020-09-10 NOTE — PHYSICAL EXAM
[General Appearance - Alert] : alert [General Appearance - In No Acute Distress] : in no acute distress [Sclera] : the sclera and conjunctiva were normal [PERRL With Normal Accommodation] : pupils were equal in size, round, and reactive to light [Extraocular Movements] : extraocular movements were intact [Neck Appearance] : the appearance of the neck was normal [Oropharynx] : the oropharynx was normal [Outer Ear] : the ears and nose were normal in appearance [Neck Cervical Mass (___cm)] : no neck mass was observed [Jugular Venous Distention Increased] : there was no jugular-venous distention [Thyroid Diffuse Enlargement] : the thyroid was not enlarged [Thyroid Nodule] : there were no palpable thyroid nodules [Heart Rate And Rhythm] : heart rate was normal and rhythm regular [Heart Sounds] : normal S1 and S2 [Auscultation Breath Sounds / Voice Sounds] : lungs were clear to auscultation bilaterally [Heart Sounds Gallop] : no gallops [Heart Sounds Pericardial Friction Rub] : no pericardial rub [Murmurs] : no murmurs [Examination Of The Chest] : the chest was normal in appearance [Chest Visual Inspection Thoracic Asymmetry] : no chest asymmetry [Diminished Respiratory Excursion] : normal chest expansion [2+] : right 2+ [No Abnormalities] : the abdominal aorta was not enlarged and no bruit was heard [Breast Appearance] : normal in appearance [Abdomen Soft] : soft [Breast Palpation Mass] : no palpable masses [Bowel Sounds] : normal bowel sounds [Abdomen Tenderness] : non-tender [Abdomen Mass (___ Cm)] : no abdominal mass palpated [Cervical Lymph Nodes Enlarged Posterior Bilaterally] : posterior cervical [Cervical Lymph Nodes Enlarged Anterior Bilaterally] : anterior cervical [Supraclavicular Lymph Nodes Enlarged Bilaterally] : supraclavicular [No CVA Tenderness] : no ~M costovertebral angle tenderness [No Spinal Tenderness] : no spinal tenderness [Skin Color & Pigmentation] : normal skin color and pigmentation [Skin Turgor] : normal skin turgor [] : no rash [Deep Tendon Reflexes (DTR)] : deep tendon reflexes were 2+ and symmetric [Sensation] : the sensory exam was normal to light touch and pinprick [No Focal Deficits] : no focal deficits [Oriented To Time, Place, And Person] : oriented to person, place, and time [Affect] : the affect was normal [Impaired Insight] : insight and judgment were intact [Abnormal Walk] : normal gait [Musculoskeletal - Swelling] : no joint swelling seen [Nail Clubbing] : no clubbing  or cyanosis of the fingernails [Motor Tone] : muscle strength and tone were normal [Right Carotid Bruit] : no bruit heard over the right carotid [Left Carotid Bruit] : no bruit heard over the left carotid [Right Femoral Bruit] : no bruit heard over the right femoral artery [Left Femoral Bruit] : no bruit heard over the left femoral artery [FreeTextEntry1] : Deferred

## 2020-09-10 NOTE — HISTORY OF PRESENT ILLNESS
[FreeTextEntry1] : Ms. SHEELA GARCIA, 70 year old female, former smoker, w/ hx of asthma, Stage II endometrioid adenocarcinoma of uterus s/p Laparoscopic hysterectomy and bilateral salpingo-oophorectomy in 01/2017, and completed vaginal brachytherapy in 04/2017, PVCs, anxiety, RA, thalassemia minor, who was found to have lung nodules on surveillance CT A/P on 10/2018, and CT scan every 6 months to monitor lung nodules.  \par \par PET/CT on 08/28/2020:\par -  New and increased bilateral pulmonary nodules for reference right upper lobe nodule measuring 1 cm (SUV 6.0; image 90) is new and left upper lobe nodule 0.9 cm (SUV 9.2; image 94) previously measured 0.5 cm on CT chest May 23, 2019. FDG avid bilateral pulmonary nodules which are new since CT chest May 23, 2019 were not present on January 27, 2017, and remain suspicious for metastatic disease. \par - Focal asymmetric FDG avidity in the left vallecula/base of tongue (SUV 6.2; image 43). - Asymmetric FDG avidity left base of tongue/vallecula may have been present on PET/CT January 27, 2017 however is more conspicuous, and remains indeterminate. Clinical correlation and direct inspection suggested. \par \par Of note, Patient was seen by dentist yesterday regarding PET/CT findings of the tongue, patient was referred to oral surgeon for f/u. \par \par Patient is here today for CT surgery consultation, referred by Dr. Leslee Russ (Gyn/Onc). Patient denies shortness of breath, cough, chest pain, fever, chills, loss of appetite, weight loss, or hemoptysis.

## 2020-09-10 NOTE — PHYSICAL EXAM
[No Acute Distress] : no acute distress [Well Nourished] : well nourished [Well Developed] : well developed [Normal Sclera/Conjunctiva] : normal sclera/conjunctiva [Well-Appearing] : well-appearing [PERRL] : pupils equal round and reactive to light [EOMI] : extraocular movements intact [Normal Outer Ear/Nose] : the outer ears and nose were normal in appearance [Normal Oropharynx] : the oropharynx was normal [No JVD] : no jugular venous distention [No Lymphadenopathy] : no lymphadenopathy [Supple] : supple [Thyroid Normal, No Nodules] : the thyroid was normal and there were no nodules present [No Respiratory Distress] : no respiratory distress  [Clear to Auscultation] : lungs were clear to auscultation bilaterally [No Accessory Muscle Use] : no accessory muscle use [Normal Rate] : normal rate  [Normal S1, S2] : normal S1 and S2 [Regular Rhythm] : with a regular rhythm [No Carotid Bruits] : no carotid bruits [No Murmur] : no murmur heard [No Abdominal Bruit] : a ~M bruit was not heard ~T in the abdomen [Pedal Pulses Present] : the pedal pulses are present [No Varicosities] : no varicosities [No Edema] : there was no peripheral edema [No Palpable Aorta] : no palpable aorta [No Extremity Clubbing/Cyanosis] : no extremity clubbing/cyanosis [Soft] : abdomen soft [Non Tender] : non-tender [Non-distended] : non-distended [No Masses] : no abdominal mass palpated [Normal Bowel Sounds] : normal bowel sounds [No HSM] : no HSM [Normal Anterior Cervical Nodes] : no anterior cervical lymphadenopathy [Normal Posterior Cervical Nodes] : no posterior cervical lymphadenopathy [No CVA Tenderness] : no CVA  tenderness [No Joint Swelling] : no joint swelling [No Spinal Tenderness] : no spinal tenderness [Grossly Normal Strength/Tone] : grossly normal strength/tone [No Rash] : no rash [No Focal Deficits] : no focal deficits [Coordination Grossly Intact] : coordination grossly intact [Normal Gait] : normal gait [Normal Affect] : the affect was normal [Normal Insight/Judgement] : insight and judgment were intact

## 2020-09-10 NOTE — CONSULT LETTER
[Dear  ___] : Dear  [unfilled], [Consult Letter:] : I had the pleasure of evaluating your patient, [unfilled]. [( Thank you for referring [unfilled] for consultation for _____ )] : Thank you for referring [unfilled] for consultation for [unfilled] [Please see my note below.] : Please see my note below. [Consult Closing:] : Thank you very much for allowing me to participate in the care of this patient.  If you have any questions, please do not hesitate to contact me. [Sincerely,] : Sincerely, [FreeTextEntry2] : Dr. Leslee uRss (Gyn/Onc/Ref)\par Dr. Bruna Juan DO (PCP) [FreeTextEntry3] : Maciel Sotelo MD \par Attending Surgeon \par Division of Thoracic Surgery \par , Cardiovascular and Thoracic Surgery \par [employer] [facility]\par \par

## 2020-09-10 NOTE — DATA REVIEWED
[FreeTextEntry1] : PET/CT on 08/28/2020:\par -  New and increased bilateral pulmonary nodules for reference right upper lobe nodule measuring 1 cm (SUV 6.0; image 90) is new and left upper lobe nodule 0.9 cm (SUV 9.2; image 94) previously measured 0.5 cm on CT chest May 23, 2019. FDG avid bilateral pulmonary nodules which are new since CT chest May 23, 2019 were not present on January 27, 2017, and remain suspicious for metastatic disease. \par - Focal asymmetric FDG avidity in the left vallecula/base of tongue (SUV 6.2; image 43). - Asymmetric FDG avidity left base of tongue/vallecula may have been present on PET/CT January 27, 2017 however is more conspicuous, and remains indeterminate. Clinical correlation and direct inspection suggested.

## 2020-09-11 ENCOUNTER — OUTPATIENT (OUTPATIENT)
Dept: OUTPATIENT SERVICES | Facility: HOSPITAL | Age: 70
LOS: 1 days | End: 2020-09-11

## 2020-09-11 ENCOUNTER — TRANSCRIPTION ENCOUNTER (OUTPATIENT)
Age: 70
End: 2020-09-11

## 2020-09-11 VITALS
HEART RATE: 75 BPM | RESPIRATION RATE: 16 BRPM | DIASTOLIC BLOOD PRESSURE: 68 MMHG | WEIGHT: 167.99 LBS | TEMPERATURE: 98 F | OXYGEN SATURATION: 98 % | SYSTOLIC BLOOD PRESSURE: 126 MMHG | HEIGHT: 61 IN

## 2020-09-11 DIAGNOSIS — Z98.890 OTHER SPECIFIED POSTPROCEDURAL STATES: Chronic | ICD-10-CM

## 2020-09-11 DIAGNOSIS — Z90.710 ACQUIRED ABSENCE OF BOTH CERVIX AND UTERUS: Chronic | ICD-10-CM

## 2020-09-11 DIAGNOSIS — Z90.49 ACQUIRED ABSENCE OF OTHER SPECIFIED PARTS OF DIGESTIVE TRACT: Chronic | ICD-10-CM

## 2020-09-11 DIAGNOSIS — R91.8 OTHER NONSPECIFIC ABNORMAL FINDING OF LUNG FIELD: ICD-10-CM

## 2020-09-11 DIAGNOSIS — Z01.818 ENCOUNTER FOR OTHER PREPROCEDURAL EXAMINATION: ICD-10-CM

## 2020-09-11 DIAGNOSIS — M20.40 OTHER HAMMER TOE(S) (ACQUIRED), UNSPECIFIED FOOT: Chronic | ICD-10-CM

## 2020-09-11 DIAGNOSIS — N20.0 CALCULUS OF KIDNEY: Chronic | ICD-10-CM

## 2020-09-11 DIAGNOSIS — Z96.659 PRESENCE OF UNSPECIFIED ARTIFICIAL KNEE JOINT: Chronic | ICD-10-CM

## 2020-09-11 DIAGNOSIS — D22.9 MELANOCYTIC NEVI, UNSPECIFIED: Chronic | ICD-10-CM

## 2020-09-11 DIAGNOSIS — Z90.89 ACQUIRED ABSENCE OF OTHER ORGANS: Chronic | ICD-10-CM

## 2020-09-11 DIAGNOSIS — N84.0 POLYP OF CORPUS UTERI: Chronic | ICD-10-CM

## 2020-09-11 LAB
ABO + RH PNL BLD: NORMAL
ALBUMIN SERPL ELPH-MCNC: 4.5 G/DL
ALP BLD-CCNC: 99 U/L
ALT SERPL-CCNC: 14 U/L
ANION GAP SERPL CALC-SCNC: 15 MMOL/L
AST SERPL-CCNC: 25 U/L
BILIRUB SERPL-MCNC: 0.8 MG/DL
BLD GP AB SCN SERPL QL: NEGATIVE — SIGNIFICANT CHANGE UP
BUN SERPL-MCNC: 16 MG/DL
CALCIUM SERPL-MCNC: 9.8 MG/DL
CHLORIDE SERPL-SCNC: 100 MMOL/L
CO2 SERPL-SCNC: 26 MMOL/L
CREAT SERPL-MCNC: 0.75 MG/DL
GLUCOSE SERPL-MCNC: 94 MG/DL
POTASSIUM SERPL-SCNC: 5.1 MMOL/L
PROT SERPL-MCNC: 6.8 G/DL
RH IG SCN BLD-IMP: POSITIVE — SIGNIFICANT CHANGE UP
RUBV IGG FLD-ACNC: 30.2 INDEX
RUBV IGG SER-IMP: POSITIVE
SODIUM SERPL-SCNC: 141 MMOL/L

## 2020-09-11 RX ORDER — SODIUM CHLORIDE 9 MG/ML
3 INJECTION INTRAMUSCULAR; INTRAVENOUS; SUBCUTANEOUS EVERY 8 HOURS
Refills: 0 | Status: DISCONTINUED | OUTPATIENT
Start: 2020-09-17 | End: 2020-09-18

## 2020-09-11 RX ORDER — SODIUM CHLORIDE 9 MG/ML
1000 INJECTION, SOLUTION INTRAVENOUS
Refills: 0 | Status: DISCONTINUED | OUTPATIENT
Start: 2020-09-17 | End: 2020-09-18

## 2020-09-11 NOTE — H&P PST ADULT - ATTENDING COMMENTS
Patient seen and examined agree with above note as modified, where appropriate, by me. The risks benefits and alternatives of the procedure were explained to the patient including but not limited to bleeding, infection, prolonged air leak, shortness of breath, chronic pain, lymphatic leak and nerve injury were explained to the patient. All of the patients questions were answered, she demonstrated understanding and freely consented to the procedure.

## 2020-09-11 NOTE — H&P PST ADULT - CARDIOVASCULAR COMMENTS
Pt reports hx of palpitations approx 2016. Was evaluated by Cardiology at the time, etiology unknown per pt. No episodes since that time per pt

## 2020-09-11 NOTE — H&P PST ADULT - NSICDXPASTMEDICALHX_GEN_ALL_CORE_FT
PAST MEDICAL HISTORY:  Asthma mild-intermittent asthma.  uses albuerol inhaler prn, most recently several months ago PAST MEDICAL HISTORY:  Asthma mild-intermittent asthma. seasonal/ enviormental   uses albuerol inhaler prn, most recently several months ago    Endometrial ca hysterecotmy, follwoed by RT  2017    H/O thalassemia minor     Obesity     Seasonal allergies and enviormnental (cats) PAST MEDICAL HISTORY:  Anemia     Asthma mild-intermittent asthma    Endometrial ca hysterectomy, followed by RT  2017    H/O thalassemia minor     Kidney stones     Obesity     Palpitations Had palpitations approximately 4 years ago (2016).  s/p Cardiolgoy eval at that time, etiology unknown. No episdoes since that time.    Pulmonary nodule     Seasonal allergies and enviornmental (cats)

## 2020-09-11 NOTE — H&P PST ADULT - NSICDXPROBLEM_GEN_ALL_CORE_FT
PROBLEM DIAGNOSES  Problem: Lung nodules  Assessment and Plan:  Right Video Assisted Thoracoscopy,, multiple wedge resection  CBC CMP EKG done by PMD, requested onchart, T&S done at PST  Preop instructions and antibacterial soap given and explained (verbal and written), with teach back.   Pt reports she has appt with PMD for pre-op eval 9/14/20, requested on chart  VIJAYA precautions, OR booking informed  Covid test 9/13/20 per pt    Problem: Allergic to IV contrast  Assessment and Plan: and seafood allergy, mOR booking informed    Problem: History of palpitations  Assessment and Plan: Pt reports she had cardiology evaluation for palpitations approx 2016.  Consult done at that time requeted on chart    Problem: History of total bilateral knee replacement  Assessment and Plan: OR booking informed       PROBLEM DIAGNOSES  Problem: Lung nodules  Assessment and Plan:  Right Video Assisted Thoracoscopy,, multiple wedge resection  CBC CMP EKG done by PMD, requested onchart, T&S done at PST  Preop instructions and antibacterial soap given and explained (verbal and written), with teach back.   Pt reports she has appt with PMD for pre-op eval 9/14/20, requested on chart  VIJAYA precautions, OR booking informed  Covid test 9/13/20 per pt    Problem: Allergic to IV contrast  Assessment and Plan: and seafood allergy, OR booking informed    Problem: History of palpitations  Assessment and Plan: Pt reports she had cardiology evaluation for palpitations approx 2016.  Consult done at that time requeted on chart    Problem: History of total bilateral knee replacement  Assessment and Plan: OR booking informed       PROBLEM DIAGNOSES  Problem: Lung nodules  Assessment and Plan:  Right Video Assisted Thoracoscopy,, multiple wedge resection  CBC CMP EKG done by PMD 9/10/20, requested on chart, T&S done at [facility]  Preop instructions and antibacterial soap given and explained (verbal and written), with teach back.   Pt reports she has was seen by PMD for pre-op 9/10/20, requested on chart  VIJAYA precautions, OR booking informed  Covid test 9/13/20 per pt    Problem: Allergic to IV contrast  Assessment and Plan: and seafood allergy, OR booking informed    Problem: History of palpitations  Assessment and Plan: Pt reports she had cardiology evaluation for palpitations approx 2016.  Consult done at that time requeted on chart    Problem: History of total bilateral knee replacement  Assessment and Plan: OR booking informed

## 2020-09-11 NOTE — H&P PST ADULT - NEUROLOGICAL COMMENTS
Hx carpal tunnel (B_ intermittent numbness tingling fingers left hand. Fingers right hand numbness/ tingling Hx carpal tunnel (B)_ intermittent numbness tingling fingers left hand. Fingers right hand numbness/ tingling at times

## 2020-09-11 NOTE — H&P PST ADULT - HISTORY OF PRESENT ILLNESS
69 y/o female with right lung nodules which have been followed for past 2 years, and enlarged on recent imaging.  Scheduled for Right VATs, multiple wedge resection

## 2020-09-11 NOTE — H&P PST ADULT - RESPIRATORY AND THORAX COMMENTS
right lung nodules which have been followed for past 2 years, and enlarged on recent imaging.  Scheduled for Right VATs, multiple wedge resection

## 2020-09-11 NOTE — H&P PST ADULT - NSICDXPASTSURGICALHX_GEN_ALL_CORE_FT
PAST SURGICAL HISTORY:  Benign mole 1957 excision of mile  cheek    H/O dilation and curettage endometrial cancer    H/O foot surgery 2012 (L)    H/O hemorrhoidectomy 1982    H/O hernia repair ventral 2016    Hammer toe 2000's bilateral feet    History of carpal tunnel release 2017    History of colon resection 2015 transverse colon for flat polyp (benign)    History of tonsillectomy 1953    S/P bunionectomy 1990's (B)    S/P hysterectomy endometrial cancer 2017    S/P thyroidectomy partialk 2016    S/P total knee replacement 9b0 2012    Uterine polyp polypectomy 2013 PAST SURGICAL HISTORY:  Benign mole 1957 excision of mile  cheek    H/O breast surgery mammary duct excision (leaking duct) age 31    H/O dilation and curettage endometrial cancer    H/O foot surgery 2012 (L)    H/O hemorrhoidectomy 1982    H/O hernia repair ventral 2016    Hammer toe 2000's bilateral feet    History of carpal tunnel release 2017    History of colon resection 2015 transverse colon for flat polyp (benign)    History of tonsillectomy 1953    S/P bunionectomy 1990's (B)    S/P hysterectomy endometrial cancer 2017    S/P thyroidectomy partialk 2016    S/P total knee replacement 9b0 2012    Uterine polyp polypectomy 2013 PAST SURGICAL HISTORY:  Benign mole 1957 excision of mile  cheek    H/O breast surgery mammary duct excision (leaking duct) age 31    H/O dilation and curettage endometrial cancer    H/O foot surgery 2012 (L)    H/O hemorrhoidectomy 1982    H/O hernia repair ventral 2016    Hammer toe 2000's bilateral feet    History of carpal tunnel release 2017    History of colon resection 2015 transverse colon for flat polyp (benign)    History of tonsillectomy 1953    S/P bunionectomy 1990's (B)    S/P hysterectomy endometrial cancer 2017    S/P thyroidectomy partial thyroidectomy 2016    S/P total knee replacement 9b0 2012    Uterine polyp polypectomy 2013

## 2020-09-11 NOTE — H&P PST ADULT - ASSESSMENT
71 y/o female with right lung nodules which have been followed for past 2 years, and enlarged on recent imaging.  Scheduled for Right VATs, multiple wedge resection

## 2020-09-11 NOTE — H&P PST ADULT - ENDOCRINE COMMENTS
Hx of partial  thyroidectomy.  Pt reports TFT's WNL Hx of partial  thyroidectomy.  Pt reports TFT's WNL, "no medication needed"

## 2020-09-11 NOTE — H&P PST ADULT - NSANTHBMIRD_ENT_A_CORE
CONSULTATION      Mr. Nugent is a consultation request of Sakina Bernard NP, for a history of long-standing right knee and lateral right hip pain complaints. This 76-year-old black male presents with a history of insidious onset of right knee discomfort after many years of working as a . He denies any history of injury. Up to 5 years ago he did receive an injection to the joint was some improvement but he does not recall who did it.  He was placed on Naprosyn which she did tolerate but doesn't like to keep taking it as it bothers his stomach. He has not had recent injection and when denies any surgery. The pain is all through the anterior aspect of his right knee. When he walks he does get some radiating pain up the lateral aspect of the knee towards the hip. He denies any groin or buttock discomfort.  Past Medical History:   Diagnosis Date   • Arthritis    • BPH (benign prostatic hyperplasia)    • Essential (primary) hypertension    • Gastroesophageal reflux disease    • Sinusitis, chronic      Past Surgical History:   Procedure Laterality Date   • CHOLECYSTECTOMY     • COLONOSCOPY  6/19/14    Affi 5yr recall, polyp tubular adenoma    • SERVICE TO GASTROENTEROLOGY     • THYROID LOBECTOMY      Pt had done at [facility]     ALLERGIES:  No Known Allergies      OBJECTIVE:    Visit Vitals  /64   Pulse 70   Temp 97.2 °F (36.2 °C)   Resp 18   Ht 6' (1.829 m)   Wt 90 kg   BMI 26.92 kg/m²     Physical examination Gen. shows a very tall thin healthy appearing male stated age in no acute distress at rest. Ambulation is without antalgia. There is mild lateral tibial thrust on weightbearing on the right. Trace effusion is evident on the right knee cool to touch. A trace of collateral laxity is noted mid flexion 2-3 mm which is uncomfortable. There is minor grinding discomfort at the patellofemoral joint and some posterior medial palpable discomfort though Christopher's is unremarkable. No popliteal  fullness is noted. Rotation is 60° abduction 40° flexion 100° without pain. He is some minor bursal irritability. No abductor weakness is noted. Neurotension sign is negative. No pretibial edema is noted distal pulses are intact.    RADIOGRAPHIC REVIEW:  Advanced patellofemoral joint arthropathy with medial compartment narrowing of the right knee as of the hips are unremarkable for significant narrowing fracture or other compromise    Impression:    Right knee degenerative joint disease symptomatic    PLAN:    Memo has had symptoms in the right knee for years. They're predominantly anterior and reflective of his patellofemoral joint arthritis. Recommendation to engage injections was presented and he consented proceed. Sterile prep was performed followed by injection 2 cc Celestone 4 cc 0.25% Marcaine. He had marked evident relief. We can subsequently follow this with a Synvisc injection if he develops breakthrough pain. He is fairly functional currently and the need for replacement as a strategy for treatment is probably quite some distance away.     No

## 2020-09-12 ENCOUNTER — TRANSCRIPTION ENCOUNTER (OUTPATIENT)
Age: 70
End: 2020-09-12

## 2020-09-12 DIAGNOSIS — Z96.652 PRESENCE OF LEFT ARTIFICIAL KNEE JOINT: ICD-10-CM

## 2020-09-12 DIAGNOSIS — Z87.898 PERSONAL HISTORY OF OTHER SPECIFIED CONDITIONS: ICD-10-CM

## 2020-09-12 DIAGNOSIS — Z91.041 RADIOGRAPHIC DYE ALLERGY STATUS: ICD-10-CM

## 2020-09-12 DIAGNOSIS — Z96.653 PRESENCE OF ARTIFICIAL KNEE JOINT, BILATERAL: ICD-10-CM

## 2020-09-12 DIAGNOSIS — R91.8 OTHER NONSPECIFIC ABNORMAL FINDING OF LUNG FIELD: ICD-10-CM

## 2020-09-14 ENCOUNTER — APPOINTMENT (OUTPATIENT)
Dept: DISASTER EMERGENCY | Facility: CLINIC | Age: 70
End: 2020-09-14

## 2020-09-14 ENCOUNTER — TRANSCRIPTION ENCOUNTER (OUTPATIENT)
Age: 70
End: 2020-09-14

## 2020-09-15 LAB — SARS-COV-2 N GENE NPH QL NAA+PROBE: NOT DETECTED

## 2020-09-16 PROBLEM — J45.909 UNSPECIFIED ASTHMA, UNCOMPLICATED: Chronic | Status: ACTIVE | Noted: 2020-09-11

## 2020-09-16 PROBLEM — R00.2 PALPITATIONS: Chronic | Status: ACTIVE | Noted: 2020-09-11

## 2020-09-16 PROBLEM — J30.2 OTHER SEASONAL ALLERGIC RHINITIS: Chronic | Status: ACTIVE | Noted: 2020-09-11

## 2020-09-16 PROBLEM — C54.1 MALIGNANT NEOPLASM OF ENDOMETRIUM: Chronic | Status: ACTIVE | Noted: 2020-09-11

## 2020-09-16 PROBLEM — E66.9 OBESITY, UNSPECIFIED: Chronic | Status: ACTIVE | Noted: 2020-09-11

## 2020-09-16 PROBLEM — N20.0 CALCULUS OF KIDNEY: Chronic | Status: ACTIVE | Noted: 2020-09-11

## 2020-09-16 PROBLEM — D64.9 ANEMIA, UNSPECIFIED: Chronic | Status: ACTIVE | Noted: 2020-09-11

## 2020-09-16 PROBLEM — R91.1 SOLITARY PULMONARY NODULE: Chronic | Status: ACTIVE | Noted: 2020-09-11

## 2020-09-16 PROBLEM — Z86.2 PERSONAL HISTORY OF DISEASES OF THE BLOOD AND BLOOD-FORMING ORGANS AND CERTAIN DISORDERS INVOLVING THE IMMUNE MECHANISM: Chronic | Status: ACTIVE | Noted: 2020-09-11

## 2020-09-16 NOTE — ASU PATIENT PROFILE, ADULT - PSH
Benign mole  1957 excision of mile  cheek  H/O breast surgery  mammary duct excision (leaking duct) age 31  H/O dilation and curettage  endometrial cancer  H/O foot surgery  2012 (L)  H/O hemorrhoidectomy  1982  H/O hernia repair  ventral 2016  Hammer toe  2000's bilateral feet  History of carpal tunnel release  2017  History of colon resection  2015 transverse colon for flat polyp (benign)  History of tonsillectomy  1953  S/P bunionectomy  1990's (B)  S/P hysterectomy  endometrial cancer 2017  S/P thyroidectomy  partial thyroidectomy 2016  S/P total knee replacement  9b0 2012  Uterine polyp  polypectomy 2013

## 2020-09-16 NOTE — ASU PATIENT PROFILE, ADULT - PMH
Anemia    Asthma  mild-intermittent asthma  Endometrial ca  hysterectomy, followed by RT  2017  H/O thalassemia minor    Kidney stones    Obesity    Palpitations  Had palpitations approximately 4 years ago (2016).  s/p Cardiolgoy eval at that time, etiology unknown. No episdoes since that time.  Pulmonary nodule    Seasonal allergies  and enviornmental (cats)

## 2020-09-17 ENCOUNTER — INPATIENT (INPATIENT)
Facility: HOSPITAL | Age: 70
LOS: 1 days | Discharge: HOME | End: 2020-09-19
Attending: THORACIC SURGERY (CARDIOTHORACIC VASCULAR SURGERY) | Admitting: THORACIC SURGERY (CARDIOTHORACIC VASCULAR SURGERY)
Payer: MEDICARE

## 2020-09-17 ENCOUNTER — RESULT REVIEW (OUTPATIENT)
Age: 70
End: 2020-09-17

## 2020-09-17 ENCOUNTER — APPOINTMENT (OUTPATIENT)
Dept: THORACIC SURGERY | Facility: HOSPITAL | Age: 70
End: 2020-09-17

## 2020-09-17 VITALS
HEIGHT: 61 IN | WEIGHT: 167.99 LBS | TEMPERATURE: 98 F | OXYGEN SATURATION: 97 % | SYSTOLIC BLOOD PRESSURE: 134 MMHG | HEART RATE: 77 BPM | RESPIRATION RATE: 16 BRPM | DIASTOLIC BLOOD PRESSURE: 69 MMHG

## 2020-09-17 DIAGNOSIS — Z98.890 OTHER SPECIFIED POSTPROCEDURAL STATES: Chronic | ICD-10-CM

## 2020-09-17 DIAGNOSIS — Z96.659 PRESENCE OF UNSPECIFIED ARTIFICIAL KNEE JOINT: Chronic | ICD-10-CM

## 2020-09-17 DIAGNOSIS — Z90.710 ACQUIRED ABSENCE OF BOTH CERVIX AND UTERUS: Chronic | ICD-10-CM

## 2020-09-17 DIAGNOSIS — N84.0 POLYP OF CORPUS UTERI: Chronic | ICD-10-CM

## 2020-09-17 DIAGNOSIS — Z90.89 ACQUIRED ABSENCE OF OTHER ORGANS: Chronic | ICD-10-CM

## 2020-09-17 DIAGNOSIS — M20.40 OTHER HAMMER TOE(S) (ACQUIRED), UNSPECIFIED FOOT: Chronic | ICD-10-CM

## 2020-09-17 DIAGNOSIS — R91.8 OTHER NONSPECIFIC ABNORMAL FINDING OF LUNG FIELD: ICD-10-CM

## 2020-09-17 DIAGNOSIS — Z90.49 ACQUIRED ABSENCE OF OTHER SPECIFIED PARTS OF DIGESTIVE TRACT: Chronic | ICD-10-CM

## 2020-09-17 DIAGNOSIS — D22.9 MELANOCYTIC NEVI, UNSPECIFIED: Chronic | ICD-10-CM

## 2020-09-17 LAB — RH IG SCN BLD-IMP: POSITIVE — SIGNIFICANT CHANGE UP

## 2020-09-17 PROCEDURE — 88341 IMHCHEM/IMCYTCHM EA ADD ANTB: CPT | Mod: 26

## 2020-09-17 PROCEDURE — 88342 IMHCHEM/IMCYTCHM 1ST ANTB: CPT | Mod: 26

## 2020-09-17 PROCEDURE — 88313 SPECIAL STAINS GROUP 2: CPT | Mod: 26

## 2020-09-17 PROCEDURE — 71045 X-RAY EXAM CHEST 1 VIEW: CPT | Mod: 26

## 2020-09-17 PROCEDURE — 32667 THORACOSCOPY W/W RESECT ADDL: CPT

## 2020-09-17 PROCEDURE — 88360 TUMOR IMMUNOHISTOCHEM/MANUAL: CPT | Mod: 26,59

## 2020-09-17 PROCEDURE — 32608 THORACOSCOPY W/BX NODULE: CPT

## 2020-09-17 PROCEDURE — 31622 DX BRONCHOSCOPE/WASH: CPT

## 2020-09-17 PROCEDURE — 32666 THORACOSCOPY W/WEDGE RESECT: CPT | Mod: RT

## 2020-09-17 PROCEDURE — 88331 PATH CONSLTJ SURG 1 BLK 1SPC: CPT | Mod: 26

## 2020-09-17 PROCEDURE — 88307 TISSUE EXAM BY PATHOLOGIST: CPT | Mod: 26

## 2020-09-17 RX ORDER — ACETAMINOPHEN 500 MG
975 TABLET ORAL ONCE
Refills: 0 | Status: COMPLETED | OUTPATIENT
Start: 2020-09-17 | End: 2020-09-17

## 2020-09-17 RX ORDER — NALOXONE HYDROCHLORIDE 4 MG/.1ML
0.1 SPRAY NASAL
Refills: 0 | Status: DISCONTINUED | OUTPATIENT
Start: 2020-09-17 | End: 2020-09-18

## 2020-09-17 RX ORDER — FENTANYL CITRATE 50 UG/ML
50 INJECTION INTRAVENOUS
Refills: 0 | Status: DISCONTINUED | OUTPATIENT
Start: 2020-09-17 | End: 2020-09-18

## 2020-09-17 RX ORDER — HEPARIN SODIUM 5000 [USP'U]/ML
5000 INJECTION INTRAVENOUS; SUBCUTANEOUS EVERY 8 HOURS
Refills: 0 | Status: DISCONTINUED | OUTPATIENT
Start: 2020-09-17 | End: 2020-09-19

## 2020-09-17 RX ORDER — HEPARIN SODIUM 5000 [USP'U]/ML
5000 INJECTION INTRAVENOUS; SUBCUTANEOUS ONCE
Refills: 0 | Status: COMPLETED | OUTPATIENT
Start: 2020-09-17 | End: 2020-09-17

## 2020-09-17 RX ORDER — HYDROMORPHONE HYDROCHLORIDE 2 MG/ML
30 INJECTION INTRAMUSCULAR; INTRAVENOUS; SUBCUTANEOUS
Refills: 0 | Status: DISCONTINUED | OUTPATIENT
Start: 2020-09-17 | End: 2020-09-18

## 2020-09-17 RX ORDER — ONDANSETRON 8 MG/1
4 TABLET, FILM COATED ORAL EVERY 6 HOURS
Refills: 0 | Status: DISCONTINUED | OUTPATIENT
Start: 2020-09-17 | End: 2020-09-18

## 2020-09-17 RX ORDER — GABAPENTIN 400 MG/1
100 CAPSULE ORAL ONCE
Refills: 0 | Status: COMPLETED | OUTPATIENT
Start: 2020-09-17 | End: 2020-09-17

## 2020-09-17 RX ORDER — ALBUTEROL 90 UG/1
1 AEROSOL, METERED ORAL
Refills: 0 | Status: DISCONTINUED | OUTPATIENT
Start: 2020-09-17 | End: 2020-09-19

## 2020-09-17 RX ORDER — SENNA PLUS 8.6 MG/1
2 TABLET ORAL AT BEDTIME
Refills: 0 | Status: DISCONTINUED | OUTPATIENT
Start: 2020-09-17 | End: 2020-09-19

## 2020-09-17 RX ORDER — HYDROMORPHONE HYDROCHLORIDE 2 MG/ML
0.3 INJECTION INTRAMUSCULAR; INTRAVENOUS; SUBCUTANEOUS
Refills: 0 | Status: DISCONTINUED | OUTPATIENT
Start: 2020-09-17 | End: 2020-09-18

## 2020-09-17 RX ADMIN — HYDROMORPHONE HYDROCHLORIDE 0.3 MILLIGRAM(S): 2 INJECTION INTRAMUSCULAR; INTRAVENOUS; SUBCUTANEOUS at 21:30

## 2020-09-17 RX ADMIN — Medication 975 MILLIGRAM(S): at 14:13

## 2020-09-17 RX ADMIN — SODIUM CHLORIDE 3 MILLILITER(S): 9 INJECTION INTRAMUSCULAR; INTRAVENOUS; SUBCUTANEOUS at 21:45

## 2020-09-17 RX ADMIN — FENTANYL CITRATE 50 MICROGRAM(S): 50 INJECTION INTRAVENOUS at 20:45

## 2020-09-17 RX ADMIN — HYDROMORPHONE HYDROCHLORIDE 0.3 MILLIGRAM(S): 2 INJECTION INTRAMUSCULAR; INTRAVENOUS; SUBCUTANEOUS at 22:17

## 2020-09-17 RX ADMIN — HEPARIN SODIUM 5000 UNIT(S): 5000 INJECTION INTRAVENOUS; SUBCUTANEOUS at 22:53

## 2020-09-17 RX ADMIN — HYDROMORPHONE HYDROCHLORIDE 30 MILLILITER(S): 2 INJECTION INTRAMUSCULAR; INTRAVENOUS; SUBCUTANEOUS at 22:12

## 2020-09-17 RX ADMIN — FENTANYL CITRATE 50 MICROGRAM(S): 50 INJECTION INTRAVENOUS at 20:30

## 2020-09-17 RX ADMIN — GABAPENTIN 100 MILLIGRAM(S): 400 CAPSULE ORAL at 13:43

## 2020-09-17 RX ADMIN — SODIUM CHLORIDE 3 MILLILITER(S): 9 INJECTION INTRAMUSCULAR; INTRAVENOUS; SUBCUTANEOUS at 13:45

## 2020-09-17 RX ADMIN — HYDROMORPHONE HYDROCHLORIDE 0.3 MILLIGRAM(S): 2 INJECTION INTRAMUSCULAR; INTRAVENOUS; SUBCUTANEOUS at 21:12

## 2020-09-17 RX ADMIN — Medication 975 MILLIGRAM(S): at 13:43

## 2020-09-17 RX ADMIN — HEPARIN SODIUM 5000 UNIT(S): 5000 INJECTION INTRAVENOUS; SUBCUTANEOUS at 13:43

## 2020-09-17 RX ADMIN — HYDROMORPHONE HYDROCHLORIDE 0.3 MILLIGRAM(S): 2 INJECTION INTRAMUSCULAR; INTRAVENOUS; SUBCUTANEOUS at 22:07

## 2020-09-17 NOTE — BRIEF OPERATIVE NOTE - NSICDXBRIEFPROCEDURE_GEN_ALL_CORE_FT
PROCEDURES:  Wedge resection of right lung with video-assisted thoracoscopic surgery (VATS) 17-Sep-2020 19:47:46 Wedge of RUL x2, and RLL x1, FB Isma Salmon

## 2020-09-17 NOTE — ASU PREOP CHECKLIST - 1.
Metal: Right and Left Total Knee Replacement, RIght and Left Toe screws, Dental implants Metal: Right and Left Total Knee Replacement, Right and Left Toe screws, Dental implants

## 2020-09-17 NOTE — ASU PREOP CHECKLIST - ADDITIONAL CONSENTS
Medical Clearance-Medical clearance done today. -No outstanding concerns that would preclude surgery.-Patient is cleared to proceed with scheduled surgery. none

## 2020-09-18 ENCOUNTER — TRANSCRIPTION ENCOUNTER (OUTPATIENT)
Age: 70
End: 2020-09-18

## 2020-09-18 LAB
ANION GAP SERPL CALC-SCNC: 15 MMO/L — HIGH (ref 7–14)
ANISOCYTOSIS BLD QL: SIGNIFICANT CHANGE UP
BASOPHILS # BLD AUTO: 0.02 K/UL — SIGNIFICANT CHANGE UP (ref 0–0.2)
BASOPHILS NFR BLD AUTO: 0.1 % — SIGNIFICANT CHANGE UP (ref 0–2)
BASOPHILS NFR SPEC: 0.9 % — SIGNIFICANT CHANGE UP (ref 0–2)
BLASTS # FLD: 0 % — SIGNIFICANT CHANGE UP (ref 0–0)
BUN SERPL-MCNC: 11 MG/DL — SIGNIFICANT CHANGE UP (ref 7–23)
CALCIUM SERPL-MCNC: 9.1 MG/DL — SIGNIFICANT CHANGE UP (ref 8.4–10.5)
CHLORIDE SERPL-SCNC: 98 MMOL/L — SIGNIFICANT CHANGE UP (ref 98–107)
CO2 SERPL-SCNC: 23 MMOL/L — SIGNIFICANT CHANGE UP (ref 22–31)
CREAT SERPL-MCNC: 0.52 MG/DL — SIGNIFICANT CHANGE UP (ref 0.5–1.3)
ELLIPTOCYTES BLD QL SMEAR: SLIGHT — SIGNIFICANT CHANGE UP
EOSINOPHIL # BLD AUTO: 0 K/UL — SIGNIFICANT CHANGE UP (ref 0–0.5)
EOSINOPHIL NFR BLD AUTO: 0 % — SIGNIFICANT CHANGE UP (ref 0–6)
EOSINOPHIL NFR FLD: 0 % — SIGNIFICANT CHANGE UP (ref 0–6)
GIANT PLATELETS BLD QL SMEAR: PRESENT — SIGNIFICANT CHANGE UP
GLUCOSE SERPL-MCNC: 133 MG/DL — HIGH (ref 70–99)
HCT VFR BLD CALC: 32.8 % — LOW (ref 34.5–45)
HGB BLD-MCNC: 10.1 G/DL — LOW (ref 11.5–15.5)
HYPOCHROMIA BLD QL: SLIGHT — SIGNIFICANT CHANGE UP
IMM GRANULOCYTES NFR BLD AUTO: 0.5 % — SIGNIFICANT CHANGE UP (ref 0–1.5)
LYMPHOCYTES # BLD AUTO: 0.81 K/UL — LOW (ref 1–3.3)
LYMPHOCYTES # BLD AUTO: 5.3 % — LOW (ref 13–44)
LYMPHOCYTES NFR SPEC AUTO: 1.8 % — LOW (ref 13–44)
MCHC RBC-ENTMCNC: 18.3 PG — LOW (ref 27–34)
MCHC RBC-ENTMCNC: 30.8 % — LOW (ref 32–36)
MCV RBC AUTO: 59.4 FL — LOW (ref 80–100)
METAMYELOCYTES # FLD: 0 % — SIGNIFICANT CHANGE UP (ref 0–1)
MICROCYTES BLD QL: SIGNIFICANT CHANGE UP
MONOCYTES # BLD AUTO: 0.45 K/UL — SIGNIFICANT CHANGE UP (ref 0–0.9)
MONOCYTES NFR BLD AUTO: 3 % — SIGNIFICANT CHANGE UP (ref 2–14)
MONOCYTES NFR BLD: 3.6 % — SIGNIFICANT CHANGE UP (ref 2–9)
MYELOCYTES NFR BLD: 0 % — SIGNIFICANT CHANGE UP (ref 0–0)
NEUTROPHIL AB SER-ACNC: 92.8 % — HIGH (ref 43–77)
NEUTROPHILS # BLD AUTO: 13.83 K/UL — HIGH (ref 1.8–7.4)
NEUTROPHILS NFR BLD AUTO: 91.1 % — HIGH (ref 43–77)
NEUTS BAND # BLD: 0 % — SIGNIFICANT CHANGE UP (ref 0–6)
NRBC # FLD: 0 K/UL — SIGNIFICANT CHANGE UP (ref 0–0)
OTHER - HEMATOLOGY %: 0 — SIGNIFICANT CHANGE UP
OVALOCYTES BLD QL SMEAR: SLIGHT — SIGNIFICANT CHANGE UP
PLATELET # BLD AUTO: 280 K/UL — SIGNIFICANT CHANGE UP (ref 150–400)
PLATELET COUNT - ESTIMATE: NORMAL — SIGNIFICANT CHANGE UP
PMV BLD: 10.1 FL — SIGNIFICANT CHANGE UP (ref 7–13)
POIKILOCYTOSIS BLD QL AUTO: SLIGHT — SIGNIFICANT CHANGE UP
POLYCHROMASIA BLD QL SMEAR: SLIGHT — SIGNIFICANT CHANGE UP
POTASSIUM SERPL-MCNC: 4.2 MMOL/L — SIGNIFICANT CHANGE UP (ref 3.5–5.3)
POTASSIUM SERPL-SCNC: 4.2 MMOL/L — SIGNIFICANT CHANGE UP (ref 3.5–5.3)
PROMYELOCYTES # FLD: 0 % — SIGNIFICANT CHANGE UP (ref 0–0)
RBC # BLD: 5.52 M/UL — HIGH (ref 3.8–5.2)
RBC # FLD: 17.1 % — HIGH (ref 10.3–14.5)
SCHISTOCYTES BLD QL AUTO: SLIGHT — SIGNIFICANT CHANGE UP
SMUDGE CELLS # BLD: PRESENT — SIGNIFICANT CHANGE UP
SODIUM SERPL-SCNC: 136 MMOL/L — SIGNIFICANT CHANGE UP (ref 135–145)
VARIANT LYMPHS # BLD: 0.9 % — SIGNIFICANT CHANGE UP
WBC # BLD: 15.18 K/UL — HIGH (ref 3.8–10.5)
WBC # FLD AUTO: 15.18 K/UL — HIGH (ref 3.8–10.5)

## 2020-09-18 PROCEDURE — 71045 X-RAY EXAM CHEST 1 VIEW: CPT | Mod: 26

## 2020-09-18 PROCEDURE — 71045 X-RAY EXAM CHEST 1 VIEW: CPT | Mod: 26,77

## 2020-09-18 RX ORDER — ACETAMINOPHEN 500 MG
650 TABLET ORAL EVERY 6 HOURS
Refills: 0 | Status: DISCONTINUED | OUTPATIENT
Start: 2020-09-18 | End: 2020-09-19

## 2020-09-18 RX ORDER — HYDROMORPHONE HYDROCHLORIDE 2 MG/ML
0.5 INJECTION INTRAMUSCULAR; INTRAVENOUS; SUBCUTANEOUS
Refills: 0 | Status: DISCONTINUED | OUTPATIENT
Start: 2020-09-18 | End: 2020-09-19

## 2020-09-18 RX ORDER — OXYCODONE HYDROCHLORIDE 5 MG/1
5 TABLET ORAL
Refills: 0 | Status: DISCONTINUED | OUTPATIENT
Start: 2020-09-18 | End: 2020-09-19

## 2020-09-18 RX ORDER — OXYCODONE HYDROCHLORIDE 5 MG/1
10 TABLET ORAL
Refills: 0 | Status: DISCONTINUED | OUTPATIENT
Start: 2020-09-18 | End: 2020-09-19

## 2020-09-18 RX ORDER — LIDOCAINE 4 G/100G
1 CREAM TOPICAL DAILY
Refills: 0 | Status: DISCONTINUED | OUTPATIENT
Start: 2020-09-18 | End: 2020-09-19

## 2020-09-18 RX ADMIN — LIDOCAINE 1 PATCH: 4 CREAM TOPICAL at 23:48

## 2020-09-18 RX ADMIN — HYDROMORPHONE HYDROCHLORIDE 0.5 MILLIGRAM(S): 2 INJECTION INTRAMUSCULAR; INTRAVENOUS; SUBCUTANEOUS at 22:13

## 2020-09-18 RX ADMIN — Medication 650 MILLIGRAM(S): at 23:48

## 2020-09-18 RX ADMIN — OXYCODONE HYDROCHLORIDE 10 MILLIGRAM(S): 5 TABLET ORAL at 15:31

## 2020-09-18 RX ADMIN — SODIUM CHLORIDE 3 MILLILITER(S): 9 INJECTION INTRAMUSCULAR; INTRAVENOUS; SUBCUTANEOUS at 05:15

## 2020-09-18 RX ADMIN — OXYCODONE HYDROCHLORIDE 10 MILLIGRAM(S): 5 TABLET ORAL at 11:45

## 2020-09-18 RX ADMIN — OXYCODONE HYDROCHLORIDE 10 MILLIGRAM(S): 5 TABLET ORAL at 21:01

## 2020-09-18 RX ADMIN — HYDROMORPHONE HYDROCHLORIDE 0.5 MILLIGRAM(S): 2 INJECTION INTRAMUSCULAR; INTRAVENOUS; SUBCUTANEOUS at 22:40

## 2020-09-18 RX ADMIN — HEPARIN SODIUM 5000 UNIT(S): 5000 INJECTION INTRAVENOUS; SUBCUTANEOUS at 21:02

## 2020-09-18 RX ADMIN — SODIUM CHLORIDE 30 MILLILITER(S): 9 INJECTION, SOLUTION INTRAVENOUS at 07:24

## 2020-09-18 RX ADMIN — SENNA PLUS 2 TABLET(S): 8.6 TABLET ORAL at 21:01

## 2020-09-18 RX ADMIN — HYDROMORPHONE HYDROCHLORIDE 30 MILLILITER(S): 2 INJECTION INTRAMUSCULAR; INTRAVENOUS; SUBCUTANEOUS at 07:24

## 2020-09-18 RX ADMIN — HEPARIN SODIUM 5000 UNIT(S): 5000 INJECTION INTRAVENOUS; SUBCUTANEOUS at 05:21

## 2020-09-18 RX ADMIN — HYDROMORPHONE HYDROCHLORIDE 30 MILLILITER(S): 2 INJECTION INTRAMUSCULAR; INTRAVENOUS; SUBCUTANEOUS at 01:28

## 2020-09-18 RX ADMIN — SODIUM CHLORIDE 3 MILLILITER(S): 9 INJECTION INTRAMUSCULAR; INTRAVENOUS; SUBCUTANEOUS at 13:07

## 2020-09-18 RX ADMIN — OXYCODONE HYDROCHLORIDE 10 MILLIGRAM(S): 5 TABLET ORAL at 22:00

## 2020-09-18 RX ADMIN — OXYCODONE HYDROCHLORIDE 10 MILLIGRAM(S): 5 TABLET ORAL at 10:52

## 2020-09-18 RX ADMIN — OXYCODONE HYDROCHLORIDE 10 MILLIGRAM(S): 5 TABLET ORAL at 16:13

## 2020-09-18 NOTE — DISCHARGE NOTE PROVIDER - NSDCCPTREATMENT_GEN_ALL_CORE_FT
PRINCIPAL PROCEDURE  Procedure: Wedge resection of right lung with video-assisted thoracoscopic surgery (VATS)  Findings and Treatment: Wedge of RUL x2, and RLL x1, FB

## 2020-09-18 NOTE — PHYSICAL THERAPY INITIAL EVALUATION ADULT - GENERAL OBSERVATIONS, REHAB EVAL
Patient received seated out of bed in a couch , (+) chest tube right side , c/o pain on the CT site during ambulation.

## 2020-09-18 NOTE — DISCHARGE NOTE PROVIDER - HOSPITAL COURSE
69 y/o female with right lung nodules which have been followed for past 2 years, and enlarged on recent imaging.  Patient s/p flex bronch, right vats, wedge resection on 9/17/20.  Post op course without complication.  Patient stable for discharge home.

## 2020-09-18 NOTE — DISCHARGE NOTE PROVIDER - NSDCMRMEDTOKEN_GEN_ALL_CORE_FT
albuterol 90 mcg/inh inhalation aerosol with adapter: inhaled 4 times a day, As Needed  Calcium 500+D: 1 tab(s) orally once a day, PM  Co Q-10 100 mg oral capsule: 1 cap(s) orally once a day, AM. last dose  9/11/20  Flax Seed Oil oral capsule: 1-5 times a week, PM  Lutein 20 mg oral tablet: 1 tab(s) orally once a day, AM  Singulair: 5 mg oral 1x/day prn 1-2x/wk. Allergy to Accolate noted.  Pt reports she takes Singulair 5mg oral 1x/day prn without reaction  traMADol 50 mg oral tablet: orally 2 times a day, As Needed  Vitamin C 100 mg oral tablet: orally once a day  Vitamin D3: 6000 unit(s) orally once a day, AM  VITAMN B COMPLEX 50, 4-5 times a week, AM:    albuterol 90 mcg/inh inhalation aerosol with adapter: inhaled 4 times a day, As Needed  Calcium 500+D: 1 tab(s) orally once a day, PM  Co Q-10 100 mg oral capsule: 1 cap(s) orally once a day, AM. last dose  9/11/20  Flax Seed Oil oral capsule: 1-5 times a week, PM  Lutein 20 mg oral tablet: 1 tab(s) orally once a day, AM  senna oral tablet: 2 tab(s) orally once a day (at bedtime)  Singulair: 5 mg oral 1x/day prn 1-2x/wk. Allergy to Accolate noted.  Pt reports she takes Singulair 5mg oral 1x/day prn without reaction  Tylenol 325 mg oral tablet: 2 tab(s) orally every 4 hours, As Needed for mild pain  Vitamin C 100 mg oral tablet: orally once a day  Vitamin D3: 6000 unit(s) orally once a day, AM  VITAMN B COMPLEX 50, 4-5 times a week, AM:    albuterol 90 mcg/inh inhalation aerosol with adapter: inhaled 4 times a day, As Needed  Calcium 500+D: 1 tab(s) orally once a day, PM  Co Q-10 100 mg oral capsule: 1 cap(s) orally once a day, AM. last dose  9/11/20  Flax Seed Oil oral capsule: 1-5 times a week, PM  lidocaine 5% topical film: Apply topically to affected area once a day, As Needed , apply for 12 hours daily   Lutein 20 mg oral tablet: 1 tab(s) orally once a day, AM  oxyCODONE 5 mg oral tablet: 1 tab(s) orally every 4  hours, As Needed -for moderate pain MDD:6 . MEDS TO BEDS, Alex 823, please call MetaNotes with copay info [id number]  senna oral tablet: 2 tab(s) orally once a day (at bedtime)  Singulair: 5 mg oral 1x/day prn 1-2x/wk. Allergy to Accolate noted.  Pt reports she takes Singulair 5mg oral 1x/day prn without reaction  Tylenol 325 mg oral tablet: 2 tab(s) orally every 4 hours, As Needed for mild pain  Vitamin C 100 mg oral tablet: orally once a day  Vitamin D3: 6000 unit(s) orally once a day, AM  VITAMN B COMPLEX 50, 4-5 times a week, AM:    albuterol 90 mcg/inh inhalation aerosol with adapter: inhaled 4 times a day, As Needed  Calcium 500+D: 1 tab(s) orally once a day, PM  Co Q-10 100 mg oral capsule: 1 cap(s) orally once a day, AM. last dose  9/11/20  Flax Seed Oil oral capsule: 1-5 times a week, PM  lidocaine 5% topical film: Apply topically to affected area once a day MDD:Only apply as needed and apply for 12 hours  Lutein 20 mg oral tablet: 1 tab(s) orally once a day, AM  oxyCODONE 5 mg oral tablet: 1 tab(s) orally every 4 hours MDD:No more than 6 total tablets per 24 hours. Only take as needed  senna oral tablet: 2 tab(s) orally once a day (at bedtime)  Singulair: 5 mg oral 1x/day prn 1-2x/wk. Allergy to Accolate noted.  Pt reports she takes Singulair 5mg oral 1x/day prn without reaction  Tylenol 325 mg oral tablet: 2 tab(s) orally every 4 hours, As Needed for mild pain  Vitamin C 100 mg oral tablet: orally once a day  Vitamin D3: 6000 unit(s) orally once a day, AM  VITAMN B COMPLEX 50, 4-5 times a week, AM:    albuterol 90 mcg/inh inhalation aerosol with adapter: inhaled 4 times a day, As Needed  Calcium 500+D: 1 tab(s) orally once a day, PM  Co Q-10 100 mg oral capsule: 1 cap(s) orally once a day, AM. last dose  9/11/20  Flax Seed Oil oral capsule: 1-5 times a week, PM  lidocaine 5% topical film: Apply topically to affected area once a day MDD:Only apply as needed and apply for 12 hours  Lutein 20 mg oral tablet: 1 tab(s) orally once a day, AM  oxyCODONE 5 mg oral tablet: 1 tab(s) orally every 4 hours MDD:6  senna oral tablet: 2 tab(s) orally once a day (at bedtime)  Singulair: 5 mg oral 1x/day prn 1-2x/wk. Allergy to Accolate noted.  Pt reports she takes Singulair 5mg oral 1x/day prn without reaction  Tylenol 325 mg oral tablet: 2 tab(s) orally every 4 hours, As Needed for mild pain  Vitamin C 100 mg oral tablet: orally once a day  Vitamin D3: 6000 unit(s) orally once a day, AM  VITAMN B COMPLEX 50, 4-5 times a week, AM:

## 2020-09-18 NOTE — PROGRESS NOTE ADULT - SUBJECTIVE AND OBJECTIVE BOX
Anesthesia Pain Management Service    SUBJECTIVE: I'm still in a bit of pain     Pain Scale Score	At rest: __7_ 	With Activity: ___ 	[X ] Refer to charted pain scores    THERAPY:    [ ] IV PCA Morphine		[ ] 5 mg/mL	[ ] 1 mg/mL  [X ] IV PCA Hydromorphone	[ ] 5 mg/mL	[X ] 1 mg/mL  [ ] IV PCA Fentanyl		[ ] 50 micrograms/mL    Demand dose __0.2_ lockout __6_ (minutes) Continuous Rate _0__ Total: _4.8__   mg used (in past 24 hrs)      MEDICATIONS  (STANDING):  heparin   Injectable 5000 Unit(s) SubCutaneous every 8 hours  HYDROmorphone PCA (1 mG/mL) 30 milliLiter(s) PCA Continuous PCA Continuous  lactated ringers. 1000 milliLiter(s) (30 mL/Hr) IV Continuous <Continuous>  senna 2 Tablet(s) Oral at bedtime  sodium chloride 0.9% lock flush 3 milliLiter(s) IV Push every 8 hours    MEDICATIONS  (PRN):  ALBUTerol    90 MICROgram(s) HFA Inhaler 1 Puff(s) Inhalation four times a day PRN Shortness of Breath and/or Wheezing  naloxone Injectable 0.1 milliGRAM(s) IV Push every 3 minutes PRN For ANY of the following changes in patient status:  A. RR LESS THAN 10 breaths per minute, B. Oxygen saturation LESS THAN 90%, C. Sedation score of 6  ondansetron Injectable 4 milliGRAM(s) IV Push every 6 hours PRN Nausea      OBJECTIVE: laying in bed     Sedation Score:	[ X] Alert	[ ] Drowsy 	[ ] Arousable	[ ] Asleep	[ ] Unresponsive    Side Effects:	[X ] None	[ ] Nausea	[ ] Vomiting	[ ] Pruritus  		[ ] Other:    Vital Signs Last 24 Hrs  T(C): 36.7 (18 Sep 2020 08:05), Max: 36.7 (17 Sep 2020 12:20)  T(F): 98.1 (18 Sep 2020 08:05), Max: 98.1 (17 Sep 2020 12:20)  HR: 83 (18 Sep 2020 08:05) (61 - 83)  BP: 128/68 (18 Sep 2020 08:05) (116/50 - 139/71)  BP(mean): 69 (18 Sep 2020 00:00) (66 - 87)  RR: 18 (18 Sep 2020 08:05) (12 - 23)  SpO2: 100% (18 Sep 2020 08:05) (95% - 100%)    ASSESSMENT/ PLAN    Therapy to  be:	[ ] Continue   [ X] Discontinued   [X ] Change to prn Analgesics    Documentation and Verification of current medications:   [X] Done	[ ] Not done, not elligible    Comments: PRN Oral/IV opioids and/or Adjuvant medication to be ordered at this point.    Progress Note written now but Patient was seen earlier.

## 2020-09-18 NOTE — DISCHARGE NOTE PROVIDER - CARE PROVIDER_API CALL
Maciel Sotelo  SURGERY  [address] [place] [address] [place] [address]  Phone: [phone]  Fax: [phone]  Follow Up Time:

## 2020-09-18 NOTE — PROGRESS NOTE ADULT - SUBJECTIVE AND OBJECTIVE BOX
Anesthesia Pain Management Service    SUBJECTIVE:    Therapy:	  [x ] IV PCA	   [ ] Epidural           [ ] s/p Spinal Opoid              [ ] Postpartum infusion	  [ ] Patient controlled regional anesthesia (PCRA)    [ ] prn Analgesics    OBJECTIVE:   [x ] No new signs     [ ] Other:    Side Effects:  [x ] None			[ ] Other:    Assessment of Catheter Site:		[ ] Intact		[ ] Other:    ASSESSMENT/PLAN  [ ] Continue current therapy    [ x] Therapy changed to:    [ ] IV PCA       [ ] Epidural     [x ] prn Analgesics     Comments:

## 2020-09-18 NOTE — PHYSICAL THERAPY INITIAL EVALUATION ADULT - PERTINENT HX OF CURRENT PROBLEM, REHAB EVAL
This is a 69 y/o female with right lung nodules  is now s/p  Right VATs, on 9/17/20  multiple wedge resection

## 2020-09-18 NOTE — PROGRESS NOTE ADULT - SUBJECTIVE AND OBJECTIVE BOX
Subjective: no acute complaints    Vital Signs:  Vital Signs Last 24 Hrs  T(C): 36.9 (09-18-20 @ 11:59), Max: 36.9 (09-18-20 @ 11:59)  T(F): 98.5 (09-18-20 @ 11:59), Max: 98.5 (09-18-20 @ 11:59)  HR: 74 (09-18-20 @ 11:59) (61 - 83)  BP: 129/57 (09-18-20 @ 11:59) (116/50 - 139/71)  RR: 18 (09-18-20 @ 11:59) (12 - 23)  SpO2: 100% (09-18-20 @ 11:59) (95% - 100%) on (O2)    Telemetry/Alarms:  General: WN/WD NAD  Neurology: Awake, nonfocal, BARON x 4  Eyes: Scleras clear, PERRLA/ EOMI, Gross vision intact  ENT:Gross hearing intact, grossly patent pharynx, no stridor  Neck: Neck supple, trachea midline, No JVD,   Respiratory: CTA B/L, No wheezing, rales, rhonchi  CV: RRR, S1S2, no murmurs, rubs or gallops  Abdominal: Soft, NT, ND +BS,   Extremities: No edema, + peripheral pulses  Skin: No Rashes, Hematoma, Ecchymosis  Lymphatic: No Neck, axilla, groin LAD  Psych: Oriented x 3, normal affect  Incisions: c,d,i  Tubes: right chest tube drained 55cc/24h on ws with no air leak  Relevant labs, radiology and Medications reviewed                        10.1   15.18 )-----------( 280      ( 18 Sep 2020 06:30 )             32.8     09-18    136  |  98  |  11  ----------------------------<  133<H>  4.2   |  23  |  0.52    Ca    9.1      18 Sep 2020 06:30        MEDICATIONS  (STANDING):  heparin   Injectable 5000 Unit(s) SubCutaneous every 8 hours  lactated ringers. 1000 milliLiter(s) (30 mL/Hr) IV Continuous <Continuous>  senna 2 Tablet(s) Oral at bedtime  sodium chloride 0.9% lock flush 3 milliLiter(s) IV Push every 8 hours    MEDICATIONS  (PRN):  ALBUTerol    90 MICROgram(s) HFA Inhaler 1 Puff(s) Inhalation four times a day PRN Shortness of Breath and/or Wheezing  HYDROmorphone  Injectable 0.5 milliGRAM(s) IV Push every 3 hours PRN Severe Breakthrough Pain  oxyCODONE    IR 5 milliGRAM(s) Oral every 3 hours PRN Moderate Pain (4 - 6)  oxyCODONE    IR 10 milliGRAM(s) Oral every 3 hours PRN Severe Pain (7 - 10)    Pertinent Physical Exam  I&O's Summary    17 Sep 2020 07:01  -  18 Sep 2020 07:00  --------------------------------------------------------  IN: 550 mL / OUT: 805 mL / NET: -255 mL    18 Sep 2020 07:01  -  18 Sep 2020 14:23  --------------------------------------------------------  IN: 330 mL / OUT: 240 mL / NET: 90 mL        Assessment  70y Female  w/ PAST MEDICAL & SURGICAL HISTORY:  Anemia    Kidney stones    Palpitations  Had palpitations approximately 4 years ago (2016).  s/p Cardiolgoy eval at that time, etiology unknown. No episdoes since that time.    Pulmonary nodule    H/O thalassemia minor    Endometrial ca  hysterectomy, followed by RT  2017    Obesity    Seasonal allergies  and enviornmental (cats)    Asthma  mild-intermittent asthma    H/O breast surgery  mammary duct excision (leaking duct) age 31    History of carpal tunnel release  2017    S/P hysterectomy  endometrial cancer 2017    H/O dilation and curettage  endometrial cancer    S/P thyroidectomy  partial thyroidectomy 2016    H/O hernia repair  ventral 2016    History of colon resection  2015 transverse colon for flat polyp (benign)    Uterine polyp  polypectomy 2013    H/O foot surgery  2012 (L)    S/P total knee replacement  9b0 2012    Hammer toe  2000&#x27;s bilateral feet    S/P bunionectomy  1990&#x27;s (B)    H/O hemorrhoidectomy  1982    Benign mole  1957 excision of mile  cheek    History of tonsillectomy  1953    Patient is a 70y old  Female s/p  right lung with video-assisted thoracoscopic surgery (VATS)9/17/20        PLAN  Neuro: Pain management  Pulm: Encourage coughing, deep breathing and use of incentive spirometry. Wean off supplemental oxygen as able. Daily CXR.   Cardio: Monitor telemetry/alarms  GI: Tolerating diet. Continue stool softeners.  Renal: monitor urine output, supplement electrolytes as needed  Vasc: Heparin SC/SCDs for DVT prophylaxis  Heme: Stable H/H. .   ID: Off antibiotics. Stable.  Therapy: OOB/ambulate  Tubes will removed chest tube today  Disposition: tube out today and home tomorrow  Discussed with Cardiothoracic Team at AM rounds.

## 2020-09-18 NOTE — DISCHARGE NOTE PROVIDER - NSDCACTIVITY_GEN_ALL_CORE
Walking - Indoors allowed/No heavy lifting/straining/Do not make important decisions/Walking - Outdoors allowed/Do not drive or operate machinery/Stairs allowed/Showering allowed

## 2020-09-18 NOTE — PHYSICAL THERAPY INITIAL EVALUATION ADULT - CRITERIA FOR SKILLED THERAPEUTIC INTERVENTIONS
PT evaluation done.Patient is functioning well, ambulated with a rolling walker  and without any loss of balance, hence Restorative PT is not indicated at this time.

## 2020-09-18 NOTE — PROGRESS NOTE ADULT - SUBJECTIVE AND OBJECTIVE BOX
Pt is without complaints.  Post-op pain has improved  VSS  Gen: Awake and alert  Chest: R Chest tube to suction , no AL, minimal drainage  CXR: PTX on WS  A: f/u AM CXR  P: Possible tube out and  home 9/18

## 2020-09-19 ENCOUNTER — TRANSCRIPTION ENCOUNTER (OUTPATIENT)
Age: 70
End: 2020-09-19

## 2020-09-19 VITALS
HEART RATE: 80 BPM | DIASTOLIC BLOOD PRESSURE: 67 MMHG | OXYGEN SATURATION: 96 % | RESPIRATION RATE: 18 BRPM | TEMPERATURE: 98 F | SYSTOLIC BLOOD PRESSURE: 118 MMHG

## 2020-09-19 LAB
ANION GAP SERPL CALC-SCNC: 12 MMO/L — SIGNIFICANT CHANGE UP (ref 7–14)
BUN SERPL-MCNC: 14 MG/DL — SIGNIFICANT CHANGE UP (ref 7–23)
CALCIUM SERPL-MCNC: 9 MG/DL — SIGNIFICANT CHANGE UP (ref 8.4–10.5)
CHLORIDE SERPL-SCNC: 103 MMOL/L — SIGNIFICANT CHANGE UP (ref 98–107)
CO2 SERPL-SCNC: 24 MMOL/L — SIGNIFICANT CHANGE UP (ref 22–31)
CREAT SERPL-MCNC: 0.67 MG/DL — SIGNIFICANT CHANGE UP (ref 0.5–1.3)
GLUCOSE SERPL-MCNC: 107 MG/DL — HIGH (ref 70–99)
HCT VFR BLD CALC: 30.4 % — LOW (ref 34.5–45)
HGB BLD-MCNC: 9.2 G/DL — LOW (ref 11.5–15.5)
MCHC RBC-ENTMCNC: 18.7 PG — LOW (ref 27–34)
MCHC RBC-ENTMCNC: 30.3 % — LOW (ref 32–36)
MCV RBC AUTO: 61.7 FL — LOW (ref 80–100)
NRBC # FLD: 0 K/UL — SIGNIFICANT CHANGE UP (ref 0–0)
PLATELET # BLD AUTO: 249 K/UL — SIGNIFICANT CHANGE UP (ref 150–400)
PMV BLD: 10 FL — SIGNIFICANT CHANGE UP (ref 7–13)
POTASSIUM SERPL-MCNC: 4.3 MMOL/L — SIGNIFICANT CHANGE UP (ref 3.5–5.3)
POTASSIUM SERPL-SCNC: 4.3 MMOL/L — SIGNIFICANT CHANGE UP (ref 3.5–5.3)
RBC # BLD: 4.93 M/UL — SIGNIFICANT CHANGE UP (ref 3.8–5.2)
RBC # FLD: 16.8 % — HIGH (ref 10.3–14.5)
SODIUM SERPL-SCNC: 139 MMOL/L — SIGNIFICANT CHANGE UP (ref 135–145)
WBC # BLD: 9.16 K/UL — SIGNIFICANT CHANGE UP (ref 3.8–10.5)
WBC # FLD AUTO: 9.16 K/UL — SIGNIFICANT CHANGE UP (ref 3.8–10.5)

## 2020-09-19 PROCEDURE — 71045 X-RAY EXAM CHEST 1 VIEW: CPT | Mod: 26

## 2020-09-19 RX ORDER — TRAMADOL HYDROCHLORIDE 50 MG/1
0 TABLET ORAL
Qty: 0 | Refills: 0 | DISCHARGE

## 2020-09-19 RX ORDER — OXYCODONE HYDROCHLORIDE 5 MG/1
1 TABLET ORAL
Qty: 30 | Refills: 0
Start: 2020-09-19 | End: 2020-09-23

## 2020-09-19 RX ORDER — LIDOCAINE 4 G/100G
1 CREAM TOPICAL
Qty: 5 | Refills: 0
Start: 2020-09-19 | End: 2020-09-23

## 2020-09-19 RX ORDER — LIDOCAINE 4 G/100G
1 CREAM TOPICAL
Qty: 7 | Refills: 0
Start: 2020-09-19 | End: 2020-09-25

## 2020-09-19 RX ORDER — SENNA PLUS 8.6 MG/1
2 TABLET ORAL
Qty: 0 | Refills: 0 | DISCHARGE
Start: 2020-09-19

## 2020-09-19 RX ADMIN — Medication 650 MILLIGRAM(S): at 05:19

## 2020-09-19 RX ADMIN — LIDOCAINE 1 PATCH: 4 CREAM TOPICAL at 12:24

## 2020-09-19 RX ADMIN — LIDOCAINE 1 PATCH: 4 CREAM TOPICAL at 12:25

## 2020-09-19 RX ADMIN — OXYCODONE HYDROCHLORIDE 10 MILLIGRAM(S): 5 TABLET ORAL at 01:34

## 2020-09-19 RX ADMIN — Medication 650 MILLIGRAM(S): at 13:25

## 2020-09-19 RX ADMIN — HYDROMORPHONE HYDROCHLORIDE 0.5 MILLIGRAM(S): 2 INJECTION INTRAMUSCULAR; INTRAVENOUS; SUBCUTANEOUS at 12:55

## 2020-09-19 RX ADMIN — OXYCODONE HYDROCHLORIDE 5 MILLIGRAM(S): 5 TABLET ORAL at 11:30

## 2020-09-19 RX ADMIN — HYDROMORPHONE HYDROCHLORIDE 0.5 MILLIGRAM(S): 2 INJECTION INTRAMUSCULAR; INTRAVENOUS; SUBCUTANEOUS at 13:25

## 2020-09-19 RX ADMIN — OXYCODONE HYDROCHLORIDE 10 MILLIGRAM(S): 5 TABLET ORAL at 02:00

## 2020-09-19 RX ADMIN — Medication 650 MILLIGRAM(S): at 12:25

## 2020-09-19 RX ADMIN — OXYCODONE HYDROCHLORIDE 5 MILLIGRAM(S): 5 TABLET ORAL at 09:35

## 2020-09-19 NOTE — DISCHARGE NOTE NURSING/CASE MANAGEMENT/SOCIAL WORK - PATIENT PORTAL LINK FT
You can access the FollowMyHealth Patient Portal offered by [facility] by registering at the following website: [email or web address]. By joining [employer]’s FollowMyHealth portal, you will also be able to view your health information using other applications (apps) compatible with our system.

## 2020-09-28 ENCOUNTER — TRANSCRIPTION ENCOUNTER (OUTPATIENT)
Age: 70
End: 2020-09-28

## 2020-09-30 ENCOUNTER — APPOINTMENT (OUTPATIENT)
Dept: THORACIC SURGERY | Facility: CLINIC | Age: 70
End: 2020-09-30
Payer: MEDICARE

## 2020-09-30 ENCOUNTER — APPOINTMENT (OUTPATIENT)
Dept: PULMONOLOGY | Facility: CLINIC | Age: 70
End: 2020-09-30
Payer: MEDICARE

## 2020-09-30 VITALS
OXYGEN SATURATION: 98 % | DIASTOLIC BLOOD PRESSURE: 83 MMHG | BODY MASS INDEX: 31.34 KG/M2 | HEIGHT: 61 IN | WEIGHT: 166 LBS | HEART RATE: 69 BPM | SYSTOLIC BLOOD PRESSURE: 127 MMHG

## 2020-09-30 LAB — SURGICAL PATHOLOGY STUDY: SIGNIFICANT CHANGE UP

## 2020-09-30 PROCEDURE — 99212 OFFICE O/P EST SF 10 MIN: CPT

## 2020-09-30 PROCEDURE — 71045 X-RAY EXAM CHEST 1 VIEW: CPT

## 2020-10-07 ENCOUNTER — APPOINTMENT (OUTPATIENT)
Dept: THORACIC SURGERY | Facility: CLINIC | Age: 70
End: 2020-10-07
Payer: MEDICARE

## 2020-10-07 VITALS
SYSTOLIC BLOOD PRESSURE: 149 MMHG | HEART RATE: 92 BPM | HEIGHT: 61 IN | DIASTOLIC BLOOD PRESSURE: 85 MMHG | OXYGEN SATURATION: 97 % | WEIGHT: 166 LBS | BODY MASS INDEX: 31.34 KG/M2

## 2020-10-07 PROCEDURE — 99212 OFFICE O/P EST SF 10 MIN: CPT

## 2020-10-09 ENCOUNTER — TRANSCRIPTION ENCOUNTER (OUTPATIENT)
Age: 70
End: 2020-10-09

## 2020-10-12 ENCOUNTER — TRANSCRIPTION ENCOUNTER (OUTPATIENT)
Age: 70
End: 2020-10-12

## 2020-10-13 ENCOUNTER — TRANSCRIPTION ENCOUNTER (OUTPATIENT)
Age: 70
End: 2020-10-13

## 2020-10-16 ENCOUNTER — RESULT REVIEW (OUTPATIENT)
Age: 70
End: 2020-10-16

## 2020-10-16 ENCOUNTER — APPOINTMENT (OUTPATIENT)
Dept: HEMATOLOGY ONCOLOGY | Facility: CLINIC | Age: 70
End: 2020-10-16
Payer: MEDICARE

## 2020-10-16 ENCOUNTER — OUTPATIENT (OUTPATIENT)
Dept: OUTPATIENT SERVICES | Facility: HOSPITAL | Age: 70
LOS: 1 days | Discharge: HOME | End: 2020-10-16

## 2020-10-16 VITALS
DIASTOLIC BLOOD PRESSURE: 82 MMHG | OXYGEN SATURATION: 98 % | TEMPERATURE: 98.4 F | HEART RATE: 85 BPM | WEIGHT: 169.29 LBS | SYSTOLIC BLOOD PRESSURE: 146 MMHG | HEIGHT: 60.43 IN | BODY MASS INDEX: 32.8 KG/M2 | RESPIRATION RATE: 16 BRPM

## 2020-10-16 DIAGNOSIS — Z90.49 ACQUIRED ABSENCE OF OTHER SPECIFIED PARTS OF DIGESTIVE TRACT: Chronic | ICD-10-CM

## 2020-10-16 DIAGNOSIS — Z90.89 ACQUIRED ABSENCE OF OTHER ORGANS: Chronic | ICD-10-CM

## 2020-10-16 DIAGNOSIS — Z98.890 OTHER SPECIFIED POSTPROCEDURAL STATES: Chronic | ICD-10-CM

## 2020-10-16 DIAGNOSIS — Z90.710 ACQUIRED ABSENCE OF BOTH CERVIX AND UTERUS: Chronic | ICD-10-CM

## 2020-10-16 DIAGNOSIS — Z87.39 PERSONAL HISTORY OF OTHER DISEASES OF THE MUSCULOSKELETAL SYSTEM AND CONNECTIVE TISSUE: ICD-10-CM

## 2020-10-16 DIAGNOSIS — M20.40 OTHER HAMMER TOE(S) (ACQUIRED), UNSPECIFIED FOOT: Chronic | ICD-10-CM

## 2020-10-16 DIAGNOSIS — N84.0 POLYP OF CORPUS UTERI: Chronic | ICD-10-CM

## 2020-10-16 DIAGNOSIS — C54.1 MALIGNANT NEOPLASM OF ENDOMETRIUM: ICD-10-CM

## 2020-10-16 DIAGNOSIS — Z86.59 PERSONAL HISTORY OF OTHER MENTAL AND BEHAVIORAL DISORDERS: ICD-10-CM

## 2020-10-16 DIAGNOSIS — Z96.659 PRESENCE OF UNSPECIFIED ARTIFICIAL KNEE JOINT: Chronic | ICD-10-CM

## 2020-10-16 DIAGNOSIS — G37.3 ACUTE TRANSVERSE MYELITIS IN DEMYELINATING DISEASE OF CENTRAL NERVOUS SYSTEM: ICD-10-CM

## 2020-10-16 DIAGNOSIS — D22.9 MELANOCYTIC NEVI, UNSPECIFIED: Chronic | ICD-10-CM

## 2020-10-16 LAB
BASOPHILS # BLD AUTO: 0.05 K/UL — SIGNIFICANT CHANGE UP (ref 0–0.2)
BASOPHILS NFR BLD AUTO: 0.6 % — SIGNIFICANT CHANGE UP (ref 0–2)
EOSINOPHIL # BLD AUTO: 0.05 K/UL — SIGNIFICANT CHANGE UP (ref 0–0.5)
EOSINOPHIL NFR BLD AUTO: 0.6 % — SIGNIFICANT CHANGE UP (ref 0–6)
HCT VFR BLD CALC: 36.5 % — SIGNIFICANT CHANGE UP (ref 34.5–45)
HGB BLD-MCNC: 11.1 G/DL — LOW (ref 11.5–15.5)
IMM GRANULOCYTES NFR BLD AUTO: 0.4 % — SIGNIFICANT CHANGE UP (ref 0–1.5)
LYMPHOCYTES # BLD AUTO: 2.32 K/UL — SIGNIFICANT CHANGE UP (ref 1–3.3)
LYMPHOCYTES # BLD AUTO: 29.1 % — SIGNIFICANT CHANGE UP (ref 13–44)
MCHC RBC-ENTMCNC: 18.6 PG — LOW (ref 27–34)
MCHC RBC-ENTMCNC: 30.4 G/DL — LOW (ref 32–36)
MCV RBC AUTO: 61.1 FL — LOW (ref 80–100)
MONOCYTES # BLD AUTO: 0.56 K/UL — SIGNIFICANT CHANGE UP (ref 0–0.9)
MONOCYTES NFR BLD AUTO: 7 % — SIGNIFICANT CHANGE UP (ref 2–14)
NEUTROPHILS # BLD AUTO: 4.96 K/UL — SIGNIFICANT CHANGE UP (ref 1.8–7.4)
NEUTROPHILS NFR BLD AUTO: 62.3 % — SIGNIFICANT CHANGE UP (ref 43–77)
NRBC # BLD: 0 /100 WBCS — SIGNIFICANT CHANGE UP (ref 0–0)
PLATELET # BLD AUTO: 302 K/UL — SIGNIFICANT CHANGE UP (ref 150–400)
RBC # BLD: 5.97 M/UL — HIGH (ref 3.8–5.2)
RBC # FLD: 18.3 % — HIGH (ref 10.3–14.5)
WBC # BLD: 7.97 K/UL — SIGNIFICANT CHANGE UP (ref 3.8–10.5)
WBC # FLD AUTO: 7.97 K/UL — SIGNIFICANT CHANGE UP (ref 3.8–10.5)

## 2020-10-16 PROCEDURE — 99205 OFFICE O/P NEW HI 60 MIN: CPT

## 2020-10-16 RX ORDER — PROCHLORPERAZINE MALEATE 10 MG/1
10 TABLET ORAL EVERY 6 HOURS
Qty: 30 | Refills: 2 | Status: ACTIVE | COMMUNITY
Start: 2020-10-16 | End: 1900-01-01

## 2020-10-16 RX ORDER — CYANOCOBALAMIN (VITAMIN B-12) 1000 MCG
TABLET, EXTENDED RELEASE ORAL DAILY
Refills: 0 | Status: ACTIVE | COMMUNITY
Start: 2020-10-16

## 2020-10-16 RX ORDER — ONDANSETRON 8 MG/1
8 TABLET ORAL
Qty: 10 | Refills: 2 | Status: ACTIVE | COMMUNITY
Start: 2020-10-16 | End: 1900-01-01

## 2020-10-16 RX ORDER — BACLOFEN 20 MG/1
20 TABLET ORAL
Refills: 0 | Status: ACTIVE | COMMUNITY
Start: 2020-10-16

## 2020-10-19 ENCOUNTER — APPOINTMENT (OUTPATIENT)
Dept: OTOLARYNGOLOGY | Facility: CLINIC | Age: 70
End: 2020-10-19
Payer: MEDICARE

## 2020-10-19 VITALS
BODY MASS INDEX: 33.18 KG/M2 | HEART RATE: 85 BPM | SYSTOLIC BLOOD PRESSURE: 147 MMHG | HEIGHT: 60 IN | WEIGHT: 169 LBS | DIASTOLIC BLOOD PRESSURE: 85 MMHG

## 2020-10-19 LAB — SARS-COV-2 N GENE NPH QL NAA+PROBE: NOT DETECTED

## 2020-10-19 PROCEDURE — 31575 DIAGNOSTIC LARYNGOSCOPY: CPT

## 2020-10-19 PROCEDURE — 99204 OFFICE O/P NEW MOD 45 MIN: CPT | Mod: 25

## 2020-10-19 NOTE — PROCEDURE
[Gag Reflex] : gag reflex preventing mirror examination [None] : none [Serial Number: ___] : Serial Number: [unfilled] [Flexible Endoscope] : examined with the flexible endoscope [de-identified] : No lesions in the NPx, OPx, HPx or larynx.  VC are mobile, airway patent.\par

## 2020-10-19 NOTE — CONSULT LETTER
[Dear  ___] : Dear  [unfilled], [Consult Letter:] : I had the pleasure of evaluating your patient, [unfilled]. [Please see my note below.] : Please see my note below. [Consult Closing:] : Thank you very much for allowing me to participate in the care of this patient.  If you have any questions, please do not hesitate to contact me. [Sincerely,] : Sincerely, [FreeTextEntry2] : Dr. Shaun Hammer\par [address], [address]\[place] [address] [FreeTextEntry3] : Dev

## 2020-10-19 NOTE — HISTORY OF PRESENT ILLNESS
[de-identified] : pt with asthma, arthritis and dx of Stage II FIGO Grade 1 endometrial ca and is refer by Dr. Hammer for abnormal finding on PET CT.  pet ct showed 2.  Indeterminate asymmetric FDG avidity left base of tongue/vallecula. Suggest correlation with direct inspection. pt Dr. Agosto did scope and per pt did not find anything. pt has no c.o at the throat or neck. pt also oral surgeon and did not find anything.

## 2020-10-19 NOTE — PHYSICAL EXAM
[FreeTextEntry1] : No concerning lesions in the OC/OPx on inspection or palpation.\par  [Laryngoscopy Performed] : laryngoscopy was performed, see procedure section for findings [Normal] : no rashes

## 2020-10-19 NOTE — CONSULT LETTER
[Consult Letter:] : I had the pleasure of evaluating your patient, [unfilled]. [Dear  ___] : Dear  [unfilled], [Consult Closing:] : Thank you very much for allowing me to participate in the care of this patient.  If you have any questions, please do not hesitate to contact me. [Sincerely,] : Sincerely, [DrLisa  ___] : Dr. FLYNN [DrLisa ___] : Dr. FLYNN

## 2020-10-19 NOTE — HISTORY OF PRESENT ILLNESS
[Disease: _____________________] : Disease: [unfilled] [AJCC Stage: ____] : AJCC Stage: [unfilled] [de-identified] : Adenoca [de-identified] : 70F hx of asthma, arthritis and dx of Stage II FIGO Grade 1 endometrial ca presenting for initial consultation for recurrent metastatic disease.\par \par Prior onc care and surgery were performed in [place]. Pt was initially diagnosed in 2016 after she presented with PMB. On 1/4/17, she underwent a laparoscopic hysterectomy and bilateral salpingo-oophorectomy (Dr. Combs- [place]); there was no node dissection. There was extensive ASIF due to prior surgery. Pathology: endometrioid adenocarcinoma FIGO grade 1 with villoglandular features. The carcinoma superficially invaded the myometrium. Depth of myometrial invasion was less then 50%. Negative for lymphovascular invasion. Carcinoma extensively involved the endocervical glands with cervical mucosal and stromal invasion. Peritoneal cytology was benign. (Clinical stage II FIGO grade 1). In 4/2017, pt completed vaginal brachytherapy (Dr. Manolo Del Real -[place]).\par Since then, pt has been following with Dr. Russ from Gyn/Onc.\par She underwent a PET /CT 8/31/20, which showed increase in size and number of lung nodules, measuring up to 1 cm in RUL, 0.9cm in JORGE LUIS; suspicious for metastatic disease. There was also asymmetric FDG avidity of the left base of tongue/vallecula for which pt will obtain an ENT eval.\par On 9/17/20, pt underwent R Vats with wedge resection x 3 per CT surgery.  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 [de-identified] : Pt states she is overall asymptomatic. She stays active with [employer] and walking. Had colonoscopy in 2018 with removal of 5 polyps removal of portion of transverse colon.

## 2020-10-21 ENCOUNTER — NON-APPOINTMENT (OUTPATIENT)
Age: 70
End: 2020-10-21

## 2020-10-27 ENCOUNTER — RESULT REVIEW (OUTPATIENT)
Age: 70
End: 2020-10-27

## 2020-10-27 ENCOUNTER — APPOINTMENT (OUTPATIENT)
Dept: HEMATOLOGY ONCOLOGY | Facility: CLINIC | Age: 70
End: 2020-10-27
Payer: MEDICARE

## 2020-10-27 ENCOUNTER — APPOINTMENT (OUTPATIENT)
Dept: INFUSION THERAPY | Facility: HOSPITAL | Age: 70
End: 2020-10-27

## 2020-10-27 ENCOUNTER — OUTPATIENT (OUTPATIENT)
Dept: OUTPATIENT SERVICES | Facility: HOSPITAL | Age: 70
LOS: 1 days | Discharge: HOME | End: 2020-10-27

## 2020-10-27 DIAGNOSIS — Z90.49 ACQUIRED ABSENCE OF OTHER SPECIFIED PARTS OF DIGESTIVE TRACT: Chronic | ICD-10-CM

## 2020-10-27 DIAGNOSIS — Z98.890 OTHER SPECIFIED POSTPROCEDURAL STATES: Chronic | ICD-10-CM

## 2020-10-27 DIAGNOSIS — T78.40XA ALLERGY, UNSPECIFIED, INITIAL ENCOUNTER: ICD-10-CM

## 2020-10-27 DIAGNOSIS — D22.9 MELANOCYTIC NEVI, UNSPECIFIED: Chronic | ICD-10-CM

## 2020-10-27 DIAGNOSIS — Z96.659 PRESENCE OF UNSPECIFIED ARTIFICIAL KNEE JOINT: Chronic | ICD-10-CM

## 2020-10-27 DIAGNOSIS — M20.40 OTHER HAMMER TOE(S) (ACQUIRED), UNSPECIFIED FOOT: Chronic | ICD-10-CM

## 2020-10-27 DIAGNOSIS — N84.0 POLYP OF CORPUS UTERI: Chronic | ICD-10-CM

## 2020-10-27 DIAGNOSIS — Z90.710 ACQUIRED ABSENCE OF BOTH CERVIX AND UTERUS: Chronic | ICD-10-CM

## 2020-10-27 DIAGNOSIS — Z90.89 ACQUIRED ABSENCE OF OTHER ORGANS: Chronic | ICD-10-CM

## 2020-10-27 LAB
BASOPHILS # BLD AUTO: 0.02 K/UL — SIGNIFICANT CHANGE UP (ref 0–0.2)
BASOPHILS NFR BLD AUTO: 0.2 % — SIGNIFICANT CHANGE UP (ref 0–2)
EOSINOPHIL # BLD AUTO: 0 K/UL — SIGNIFICANT CHANGE UP (ref 0–0.5)
EOSINOPHIL NFR BLD AUTO: 0 % — SIGNIFICANT CHANGE UP (ref 0–6)
HCT VFR BLD CALC: 37.8 % — SIGNIFICANT CHANGE UP (ref 34.5–45)
HGB BLD-MCNC: 11.6 G/DL — SIGNIFICANT CHANGE UP (ref 11.5–15.5)
IMM GRANULOCYTES NFR BLD AUTO: 1 % — SIGNIFICANT CHANGE UP (ref 0–1.5)
LYMPHOCYTES # BLD AUTO: 0.92 K/UL — LOW (ref 1–3.3)
LYMPHOCYTES # BLD AUTO: 10.2 % — LOW (ref 13–44)
MCHC RBC-ENTMCNC: 18.6 PG — LOW (ref 27–34)
MCHC RBC-ENTMCNC: 30.7 G/DL — LOW (ref 32–36)
MCV RBC AUTO: 60.6 FL — LOW (ref 80–100)
MONOCYTES # BLD AUTO: 0.03 K/UL — SIGNIFICANT CHANGE UP (ref 0–0.9)
MONOCYTES NFR BLD AUTO: 0.3 % — LOW (ref 2–14)
NEUTROPHILS # BLD AUTO: 7.96 K/UL — HIGH (ref 1.8–7.4)
NEUTROPHILS NFR BLD AUTO: 88.3 % — HIGH (ref 43–77)
NRBC # BLD: 0 /100 WBCS — SIGNIFICANT CHANGE UP (ref 0–0)
PLATELET # BLD AUTO: 344 K/UL — SIGNIFICANT CHANGE UP (ref 150–400)
RBC # BLD: 6.24 M/UL — HIGH (ref 3.8–5.2)
RBC # FLD: 17.8 % — HIGH (ref 10.3–14.5)
WBC # BLD: 9.02 K/UL — SIGNIFICANT CHANGE UP (ref 3.8–10.5)
WBC # FLD AUTO: 9.02 K/UL — SIGNIFICANT CHANGE UP (ref 3.8–10.5)

## 2020-10-27 PROCEDURE — 90791 PSYCH DIAGNOSTIC EVALUATION: CPT

## 2020-10-27 PROCEDURE — 99214 OFFICE O/P EST MOD 30 MIN: CPT

## 2020-10-28 ENCOUNTER — NON-APPOINTMENT (OUTPATIENT)
Age: 70
End: 2020-10-28

## 2020-10-28 DIAGNOSIS — R11.2 NAUSEA WITH VOMITING, UNSPECIFIED: ICD-10-CM

## 2020-10-28 DIAGNOSIS — Z51.11 ENCOUNTER FOR ANTINEOPLASTIC CHEMOTHERAPY: ICD-10-CM

## 2020-10-29 PROBLEM — T78.40XA HYPERSENSITIVITY REACTION, INITIAL ENCOUNTER: Status: ACTIVE | Noted: 2020-10-29

## 2020-10-30 DIAGNOSIS — F43.23 ADJUSTMENT DISORDER WITH MIXED ANXIETY AND DEPRESSED MOOD: ICD-10-CM

## 2020-10-30 DIAGNOSIS — F43.20 ADJUSTMENT DISORDER, UNSPECIFIED: ICD-10-CM

## 2020-10-30 DIAGNOSIS — F33.1 MAJOR DEPRESSIVE DISORDER, RECURRENT, MODERATE: ICD-10-CM

## 2020-11-02 LAB
ALBUMIN SERPL ELPH-MCNC: 4.6 G/DL
ALP BLD-CCNC: 110 U/L
ALT SERPL-CCNC: 18 U/L
ANION GAP SERPL CALC-SCNC: 12 MMOL/L
APTT BLD: 30.9 SEC
AST SERPL-CCNC: 26 U/L
BILIRUB SERPL-MCNC: 0.5 MG/DL
BUN SERPL-MCNC: 15 MG/DL
CALCIUM SERPL-MCNC: 10 MG/DL
CANCER AG125 SERPL-ACNC: 24 U/ML
CEA SERPL-MCNC: 1.5 NG/ML
CHLORIDE SERPL-SCNC: 102 MMOL/L
CO2 SERPL-SCNC: 27 MMOL/L
CREAT SERPL-MCNC: 0.67 MG/DL
GLUCOSE SERPL-MCNC: 102 MG/DL
HAV IGM SER QL: NONREACTIVE
HBV CORE IGM SER QL: NONREACTIVE
HBV SURFACE AG SER QL: NONREACTIVE
HCV AB SER QL: NONREACTIVE
HCV S/CO RATIO: 0.07 S/CO
INR PPP: 1 RATIO
MAGNESIUM SERPL-MCNC: 2.4 MG/DL
POTASSIUM SERPL-SCNC: 4.6 MMOL/L
PROT SERPL-MCNC: 7 G/DL
PT BLD: 11.8 SEC
SODIUM SERPL-SCNC: 140 MMOL/L

## 2020-11-03 ENCOUNTER — RESULT REVIEW (OUTPATIENT)
Age: 70
End: 2020-11-03

## 2020-11-03 ENCOUNTER — LABORATORY RESULT (OUTPATIENT)
Age: 70
End: 2020-11-03

## 2020-11-03 ENCOUNTER — APPOINTMENT (OUTPATIENT)
Dept: HEMATOLOGY ONCOLOGY | Facility: CLINIC | Age: 70
End: 2020-11-03
Payer: MEDICARE

## 2020-11-03 VITALS
SYSTOLIC BLOOD PRESSURE: 153 MMHG | OXYGEN SATURATION: 95 % | WEIGHT: 167.55 LBS | TEMPERATURE: 97.7 F | HEIGHT: 60 IN | DIASTOLIC BLOOD PRESSURE: 83 MMHG | BODY MASS INDEX: 32.89 KG/M2 | HEART RATE: 104 BPM | RESPIRATION RATE: 16 BRPM

## 2020-11-03 LAB
BASOPHILS # BLD AUTO: 0.03 K/UL — SIGNIFICANT CHANGE UP (ref 0–0.2)
BASOPHILS NFR BLD AUTO: 0.8 % — SIGNIFICANT CHANGE UP (ref 0–2)
EOSINOPHIL # BLD AUTO: 0.01 K/UL — SIGNIFICANT CHANGE UP (ref 0–0.5)
EOSINOPHIL NFR BLD AUTO: 0.3 % — SIGNIFICANT CHANGE UP (ref 0–6)
HCT VFR BLD CALC: 34.9 % — SIGNIFICANT CHANGE UP (ref 34.5–45)
HGB BLD-MCNC: 10.9 G/DL — LOW (ref 11.5–15.5)
IMM GRANULOCYTES NFR BLD AUTO: 0.5 % — SIGNIFICANT CHANGE UP (ref 0–1.5)
LYMPHOCYTES # BLD AUTO: 1.26 K/UL — SIGNIFICANT CHANGE UP (ref 1–3.3)
LYMPHOCYTES # BLD AUTO: 34.4 % — SIGNIFICANT CHANGE UP (ref 13–44)
MCHC RBC-ENTMCNC: 18.7 PG — LOW (ref 27–34)
MCHC RBC-ENTMCNC: 31.2 G/DL — LOW (ref 32–36)
MCV RBC AUTO: 60 FL — LOW (ref 80–100)
MONOCYTES # BLD AUTO: 0.1 K/UL — SIGNIFICANT CHANGE UP (ref 0–0.9)
MONOCYTES NFR BLD AUTO: 2.7 % — SIGNIFICANT CHANGE UP (ref 2–14)
NEUTROPHILS # BLD AUTO: 2.24 K/UL — SIGNIFICANT CHANGE UP (ref 1.8–7.4)
NEUTROPHILS NFR BLD AUTO: 61.3 % — SIGNIFICANT CHANGE UP (ref 43–77)
NRBC # BLD: 0 /100 WBCS — SIGNIFICANT CHANGE UP (ref 0–0)
PLATELET # BLD AUTO: 260 K/UL — SIGNIFICANT CHANGE UP (ref 150–400)
RBC # BLD: 5.82 M/UL — HIGH (ref 3.8–5.2)
RBC # FLD: 15.8 % — HIGH (ref 10.3–14.5)
WBC # BLD: 3.66 K/UL — LOW (ref 3.8–10.5)
WBC # FLD AUTO: 3.66 K/UL — LOW (ref 3.8–10.5)

## 2020-11-03 PROCEDURE — 99215 OFFICE O/P EST HI 40 MIN: CPT

## 2020-11-04 ENCOUNTER — APPOINTMENT (OUTPATIENT)
Dept: CARDIOLOGY | Facility: CLINIC | Age: 70
End: 2020-11-04
Payer: MEDICARE

## 2020-11-04 ENCOUNTER — NON-APPOINTMENT (OUTPATIENT)
Age: 70
End: 2020-11-04

## 2020-11-04 VITALS
SYSTOLIC BLOOD PRESSURE: 173 MMHG | BODY MASS INDEX: 29.59 KG/M2 | HEART RATE: 89 BPM | HEIGHT: 63 IN | OXYGEN SATURATION: 96 % | WEIGHT: 167 LBS | DIASTOLIC BLOOD PRESSURE: 94 MMHG

## 2020-11-04 DIAGNOSIS — R94.39 ABNORMAL RESULT OF OTHER CARDIOVASCULAR FUNCTION STUDY: ICD-10-CM

## 2020-11-04 DIAGNOSIS — R00.2 PALPITATIONS: ICD-10-CM

## 2020-11-04 DIAGNOSIS — R07.9 CHEST PAIN, UNSPECIFIED: ICD-10-CM

## 2020-11-04 DIAGNOSIS — I49.3 VENTRICULAR PREMATURE DEPOLARIZATION: ICD-10-CM

## 2020-11-04 PROCEDURE — 99214 OFFICE O/P EST MOD 30 MIN: CPT

## 2020-11-04 PROCEDURE — 93000 ELECTROCARDIOGRAM COMPLETE: CPT

## 2020-11-04 NOTE — PHYSICAL EXAM
[General Appearance - Well Developed] : well developed [Normal Appearance] : normal appearance [Well Groomed] : well groomed [General Appearance - Well Nourished] : well nourished [No Deformities] : no deformities [General Appearance - In No Acute Distress] : no acute distress [Normal Conjunctiva] : the conjunctiva exhibited no abnormalities [Eyelids - No Xanthelasma] : the eyelids demonstrated no xanthelasmas [Normal Oral Mucosa] : normal oral mucosa [No Oral Pallor] : no oral pallor [No Oral Cyanosis] : no oral cyanosis [Normal Jugular Venous A Waves Present] : normal jugular venous A waves present [Normal Jugular Venous V Waves Present] : normal jugular venous V waves present [No Jugular Venous Dumont A Waves] : no jugular venous dumont A waves [Respiration, Rhythm And Depth] : normal respiratory rhythm and effort [Exaggerated Use Of Accessory Muscles For Inspiration] : no accessory muscle use [Auscultation Breath Sounds / Voice Sounds] : lungs were clear to auscultation bilaterally [Abdomen Soft] : soft [Abdomen Tenderness] : non-tender [Abdomen Mass (___ Cm)] : no abdominal mass palpated [Abnormal Walk] : normal gait [Gait - Sufficient For Exercise Testing] : the gait was sufficient for exercise testing [Nail Clubbing] : no clubbing of the fingernails [Cyanosis, Localized] : no localized cyanosis [Petechial Hemorrhages (___cm)] : no petechial hemorrhages [Skin Color & Pigmentation] : normal skin color and pigmentation [] : no rash [No Venous Stasis] : no venous stasis [Skin Lesions] : no skin lesions [No Skin Ulcers] : no skin ulcer [No Xanthoma] : no  xanthoma was observed [Oriented To Time, Place, And Person] : oriented to person, place, and time [Affect] : the affect was normal [Mood] : the mood was normal [No Anxiety] : not feeling anxious [Normal Rate] : normal [Normal S1] : normal S1 [Normal S2] : normal S2 [No Murmur] : no murmurs heard [2+] : left 2+ [No Abnormalities] : the abdominal aorta was not enlarged and no bruit was heard [No Pitting Edema] : no pitting edema present [S3] : no S3 [S4] : no S4 [Right Carotid Bruit] : no bruit heard over the right carotid [Left Carotid Bruit] : no bruit heard over the left carotid [Right Femoral Bruit] : no bruit heard over the right femoral artery [Left Femoral Bruit] : no bruit heard over the left femoral artery

## 2020-11-04 NOTE — HISTORY OF PRESENT ILLNESS
[FreeTextEntry1] : Ms. Chang is a 70 year old female here for cardiac follow up..\par \par She has a history of premature ventricular contractions and ventricular bigeminy. This usually occurs in the setting of steroid based asthma medications, which she is currently off of. In the past, she has been on Inderal when taking her medications.He specifically had to see a cardiologist to get the prescription for inderal. She is no longer taking his medications because the palpitations have subsided. In 2014 she had a nuclear stress test.EKG part of the test was positive but the scans were negative for ischemia. She achieved a workload of 7.5 mets.\par \musa Was diagnosed with metastatic endometrial cancer and started chemotherapy about a week ago. During the chemotherapy infusion she had both chest heaviness and jaw pain which she attributes to the chemotherapy and seemed to resolve with IV Solu-Medrol. She is concerned that this could be cardiac. She does not complain of any significant chest pain with physical exertion. Since the chemotherapy she was getting palpitations but these have subsided.\par \par ECG today demonstrates sinus rhythm with poor R. progression anterior leads. This represents no acute change.\par \par

## 2020-11-04 NOTE — HISTORY OF PRESENT ILLNESS
[Disease: _____________________] : Disease: [unfilled] [AJCC Stage: ____] : AJCC Stage: [unfilled] [de-identified] : 70F hx of asthma, arthritis and dx of Stage II FIGO Grade 1 endometrial ca presenting for initial consultation for recurrent metastatic disease.\par \par Prior onc care and surgery were performed in [place]. Pt was initially diagnosed in 2016 after she presented with PMB. On 1/4/17, she underwent a laparoscopic hysterectomy and bilateral salpingo-oophorectomy (Dr. Combs- [place]); there was no node dissection. There was extensive ASIF due to prior surgery. Pathology: endometrioid adenocarcinoma FIGO grade 1 with villoglandular features. The carcinoma superficially invaded the myometrium. Depth of myometrial invasion was less then 50%. Negative for lymphovascular invasion. Carcinoma extensively involved the endocervical glands with cervical mucosal and stromal invasion. Peritoneal cytology was benign. (Clinical stage II FIGO grade 1). In 4/2017, pt completed vaginal brachytherapy (Dr. Manolo Del Real -[place]).\par Since then, pt has been following with Dr. Russ from Gyn/Onc.\par She underwent a PET /CT 8/31/20, which showed increase in size and number of lung nodules, measuring up to 1 cm in RUL, 0.9cm in JORGE LUIS; suspicious for metastatic disease. There was also asymmetric FDG avidity of the left base of tongue/vallecula for which pt will obtain an ENT eval.\par On 9/17/20, pt underwent R Vats with wedge resection x 3 per CT surgery.  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 [de-identified] : Adenoca [FreeTextEntry1] : Carboplatin and Taxol\par C1 - 10/27/20 [de-identified] : Patient is here for follow up after first cycle of chemo.  She did have a Taxol reaction during infusion but was able to complete treatment without difficulty.  She had some intermittent palpitations after the treatment which has slowly decreased but is still present.  She has some metallic taste changes and intermittent nausea, gets relief with Compazine. She had some arthralgias for about 5 days after treatment which has resolved.  She had some diarrhea day after chemo, took Immodium for relief.

## 2020-11-13 ENCOUNTER — APPOINTMENT (OUTPATIENT)
Dept: CARDIOLOGY | Facility: CLINIC | Age: 70
End: 2020-11-13
Payer: MEDICARE

## 2020-11-13 PROCEDURE — 78452 HT MUSCLE IMAGE SPECT MULT: CPT

## 2020-11-13 PROCEDURE — 93015 CV STRESS TEST SUPVJ I&R: CPT

## 2020-11-13 PROCEDURE — A9500: CPT

## 2020-11-17 ENCOUNTER — APPOINTMENT (OUTPATIENT)
Dept: HEMATOLOGY ONCOLOGY | Facility: CLINIC | Age: 70
End: 2020-11-17
Payer: MEDICARE

## 2020-11-17 ENCOUNTER — RESULT REVIEW (OUTPATIENT)
Age: 70
End: 2020-11-17

## 2020-11-17 ENCOUNTER — OUTPATIENT (OUTPATIENT)
Dept: OUTPATIENT SERVICES | Facility: HOSPITAL | Age: 70
LOS: 1 days | Discharge: HOME | End: 2020-11-17

## 2020-11-17 ENCOUNTER — APPOINTMENT (OUTPATIENT)
Dept: INFUSION THERAPY | Facility: HOSPITAL | Age: 70
End: 2020-11-17

## 2020-11-17 DIAGNOSIS — Z98.890 OTHER SPECIFIED POSTPROCEDURAL STATES: Chronic | ICD-10-CM

## 2020-11-17 DIAGNOSIS — F43.20 ADJUSTMENT DISORDER, UNSPECIFIED: ICD-10-CM

## 2020-11-17 DIAGNOSIS — Z90.49 ACQUIRED ABSENCE OF OTHER SPECIFIED PARTS OF DIGESTIVE TRACT: Chronic | ICD-10-CM

## 2020-11-17 DIAGNOSIS — Z90.710 ACQUIRED ABSENCE OF BOTH CERVIX AND UTERUS: Chronic | ICD-10-CM

## 2020-11-17 DIAGNOSIS — Z96.659 PRESENCE OF UNSPECIFIED ARTIFICIAL KNEE JOINT: Chronic | ICD-10-CM

## 2020-11-17 DIAGNOSIS — Z90.89 ACQUIRED ABSENCE OF OTHER ORGANS: Chronic | ICD-10-CM

## 2020-11-17 DIAGNOSIS — N84.0 POLYP OF CORPUS UTERI: Chronic | ICD-10-CM

## 2020-11-17 DIAGNOSIS — M20.40 OTHER HAMMER TOE(S) (ACQUIRED), UNSPECIFIED FOOT: Chronic | ICD-10-CM

## 2020-11-17 DIAGNOSIS — D22.9 MELANOCYTIC NEVI, UNSPECIFIED: Chronic | ICD-10-CM

## 2020-11-17 DIAGNOSIS — C54.1 MALIGNANT NEOPLASM OF ENDOMETRIUM: ICD-10-CM

## 2020-11-17 LAB
BASOPHILS # BLD AUTO: 0.02 K/UL — SIGNIFICANT CHANGE UP (ref 0–0.2)
BASOPHILS NFR BLD AUTO: 0.1 % — SIGNIFICANT CHANGE UP (ref 0–2)
EOSINOPHIL # BLD AUTO: 0 K/UL — SIGNIFICANT CHANGE UP (ref 0–0.5)
EOSINOPHIL NFR BLD AUTO: 0 % — SIGNIFICANT CHANGE UP (ref 0–6)
HCT VFR BLD CALC: 34.9 % — SIGNIFICANT CHANGE UP (ref 34.5–45)
HGB BLD-MCNC: 11 G/DL — LOW (ref 11.5–15.5)
IMM GRANULOCYTES NFR BLD AUTO: 1.5 % — SIGNIFICANT CHANGE UP (ref 0–1.5)
LYMPHOCYTES # BLD AUTO: 1.43 K/UL — SIGNIFICANT CHANGE UP (ref 1–3.3)
LYMPHOCYTES # BLD AUTO: 10.4 % — LOW (ref 13–44)
MCHC RBC-ENTMCNC: 18.8 PG — LOW (ref 27–34)
MCHC RBC-ENTMCNC: 31.5 G/DL — LOW (ref 32–36)
MCV RBC AUTO: 59.8 FL — LOW (ref 80–100)
MONOCYTES # BLD AUTO: 0.08 K/UL — SIGNIFICANT CHANGE UP (ref 0–0.9)
MONOCYTES NFR BLD AUTO: 0.6 % — LOW (ref 2–14)
NEUTROPHILS # BLD AUTO: 11.96 K/UL — HIGH (ref 1.8–7.4)
NEUTROPHILS NFR BLD AUTO: 87.4 % — HIGH (ref 43–77)
NRBC # BLD: 0 /100 WBCS — SIGNIFICANT CHANGE UP (ref 0–0)
PLATELET # BLD AUTO: 234 K/UL — SIGNIFICANT CHANGE UP (ref 150–400)
RBC # BLD: 5.84 M/UL — HIGH (ref 3.8–5.2)
RBC # FLD: 19.2 % — HIGH (ref 10.3–14.5)
WBC # BLD: 13.7 K/UL — HIGH (ref 3.8–10.5)
WBC # FLD AUTO: 13.7 K/UL — HIGH (ref 3.8–10.5)

## 2020-11-17 PROCEDURE — 90834 PSYTX W PT 45 MINUTES: CPT

## 2020-11-18 DIAGNOSIS — R11.2 NAUSEA WITH VOMITING, UNSPECIFIED: ICD-10-CM

## 2020-11-18 DIAGNOSIS — Z51.11 ENCOUNTER FOR ANTINEOPLASTIC CHEMOTHERAPY: ICD-10-CM

## 2020-11-20 PROBLEM — F43.20 ADJUSTMENT DISORDER: Status: ACTIVE | Noted: 2020-10-27

## 2020-11-24 ENCOUNTER — RESULT REVIEW (OUTPATIENT)
Age: 70
End: 2020-11-24

## 2020-11-24 ENCOUNTER — TRANSCRIPTION ENCOUNTER (OUTPATIENT)
Age: 70
End: 2020-11-24

## 2020-11-24 ENCOUNTER — APPOINTMENT (OUTPATIENT)
Dept: HEMATOLOGY ONCOLOGY | Facility: CLINIC | Age: 70
End: 2020-11-24
Payer: MEDICARE

## 2020-11-24 VITALS
RESPIRATION RATE: 16 BRPM | SYSTOLIC BLOOD PRESSURE: 161 MMHG | BODY MASS INDEX: 29.09 KG/M2 | DIASTOLIC BLOOD PRESSURE: 84 MMHG | WEIGHT: 164.24 LBS | TEMPERATURE: 97 F | OXYGEN SATURATION: 98 % | HEART RATE: 105 BPM

## 2020-11-24 LAB
BASOPHILS # BLD AUTO: 0.05 K/UL — SIGNIFICANT CHANGE UP (ref 0–0.2)
BASOPHILS NFR BLD AUTO: 0.9 % — SIGNIFICANT CHANGE UP (ref 0–2)
EOSINOPHIL # BLD AUTO: 0.01 K/UL — SIGNIFICANT CHANGE UP (ref 0–0.5)
EOSINOPHIL NFR BLD AUTO: 0.2 % — SIGNIFICANT CHANGE UP (ref 0–6)
HCT VFR BLD CALC: 32 % — LOW (ref 34.5–45)
HGB BLD-MCNC: 10.2 G/DL — LOW (ref 11.5–15.5)
IMM GRANULOCYTES NFR BLD AUTO: 0.5 % — SIGNIFICANT CHANGE UP (ref 0–1.5)
LYMPHOCYTES # BLD AUTO: 1.95 K/UL — SIGNIFICANT CHANGE UP (ref 1–3.3)
LYMPHOCYTES # BLD AUTO: 35.7 % — SIGNIFICANT CHANGE UP (ref 13–44)
MCHC RBC-ENTMCNC: 19.3 PG — LOW (ref 27–34)
MCHC RBC-ENTMCNC: 31.9 G/DL — LOW (ref 32–36)
MCV RBC AUTO: 60.5 FL — LOW (ref 80–100)
MONOCYTES # BLD AUTO: 0.14 K/UL — SIGNIFICANT CHANGE UP (ref 0–0.9)
MONOCYTES NFR BLD AUTO: 2.6 % — SIGNIFICANT CHANGE UP (ref 2–14)
NEUTROPHILS # BLD AUTO: 3.28 K/UL — SIGNIFICANT CHANGE UP (ref 1.8–7.4)
NEUTROPHILS NFR BLD AUTO: 60.1 % — SIGNIFICANT CHANGE UP (ref 43–77)
NRBC # BLD: 0 /100 WBCS — SIGNIFICANT CHANGE UP (ref 0–0)
PLATELET # BLD AUTO: 202 K/UL — SIGNIFICANT CHANGE UP (ref 150–400)
RBC # BLD: 5.29 M/UL — HIGH (ref 3.8–5.2)
RBC # FLD: 17.2 % — HIGH (ref 10.3–14.5)
WBC # BLD: 5.46 K/UL — SIGNIFICANT CHANGE UP (ref 3.8–10.5)
WBC # FLD AUTO: 5.46 K/UL — SIGNIFICANT CHANGE UP (ref 3.8–10.5)

## 2020-11-24 PROCEDURE — 99214 OFFICE O/P EST MOD 30 MIN: CPT

## 2020-12-03 ENCOUNTER — APPOINTMENT (OUTPATIENT)
Dept: CARDIOLOGY | Facility: CLINIC | Age: 70
End: 2020-12-03
Payer: MEDICARE

## 2020-12-03 PROCEDURE — 93306 TTE W/DOPPLER COMPLETE: CPT

## 2020-12-04 ENCOUNTER — APPOINTMENT (OUTPATIENT)
Dept: DISASTER EMERGENCY | Facility: CLINIC | Age: 70
End: 2020-12-04

## 2020-12-05 LAB — SARS-COV-2 N GENE NPH QL NAA+PROBE: NOT DETECTED

## 2020-12-05 NOTE — ASSESSMENT
[Palliative] : Goals of care discussed with patient: Palliative [Palliative Care Plan] : not applicable at this time [FreeTextEntry1] : 70 year old woman with relapsed EMCA on chemotherapy with Carboplatin and Taxol.\par She has completed 2 cycles and is tolerating current regimen well.\par Will get scans after next cycle to evaluate response to treatment.

## 2020-12-05 NOTE — HISTORY OF PRESENT ILLNESS
[Disease: _____________________] : Disease: [unfilled] [AJCC Stage: ____] : AJCC Stage: [unfilled] [de-identified] : 70F hx of asthma, arthritis and dx of Stage II FIGO Grade 1 endometrial ca presenting for initial consultation for recurrent metastatic disease.\par \par Prior onc care and surgery were performed in [place]. Pt was initially diagnosed in 2016 after she presented with PMB. On 1/4/17, she underwent a laparoscopic hysterectomy and bilateral salpingo-oophorectomy (Dr. Combs- [place]); there was no node dissection. There was extensive ASIF due to prior surgery. Pathology: endometrioid adenocarcinoma FIGO grade 1 with villoglandular features. The carcinoma superficially invaded the myometrium. Depth of myometrial invasion was less then 50%. Negative for lymphovascular invasion. Carcinoma extensively involved the endocervical glands with cervical mucosal and stromal invasion. Peritoneal cytology was benign. (Clinical stage II FIGO grade 1). In 4/2017, pt completed vaginal brachytherapy (Dr. Manolo Del Real -[place]).\par Since then, pt has been following with Dr. Russ from Gyn/Onc.\par She underwent a PET /CT 8/31/20, which showed increase in size and number of lung nodules, measuring up to 1 cm in RUL, 0.9cm in JORGE LUIS; suspicious for metastatic disease. There was also asymmetric FDG avidity of the left base of tongue/vallecula for which pt will obtain an ENT eval.\par On 9/17/20, pt underwent R Vats with wedge resection x 3 per CT surgery.  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 [de-identified] : Adenoca [FreeTextEntry1] : Carboplatin and Taxol\par C1 - 10/27/20\par C2 -11/17/20 [de-identified] : Patient is here for follow up after her second cycle of chemo which she says she tolerated much better than the first.  Arthralgias were less intense.  SHe did have weakness, fatigue and decreased appetite which is improved today.  She denies any nausea, vomiting or diarrhea.  She denies fever, chills, chest pain, SOB, abdominal pain, neuropathy.

## 2020-12-08 ENCOUNTER — RESULT REVIEW (OUTPATIENT)
Age: 70
End: 2020-12-08

## 2020-12-08 ENCOUNTER — APPOINTMENT (OUTPATIENT)
Dept: HEMATOLOGY ONCOLOGY | Facility: CLINIC | Age: 70
End: 2020-12-08

## 2020-12-08 ENCOUNTER — APPOINTMENT (OUTPATIENT)
Dept: INFUSION THERAPY | Facility: HOSPITAL | Age: 70
End: 2020-12-08

## 2020-12-08 LAB
BASOPHILS # BLD AUTO: 0.01 K/UL — SIGNIFICANT CHANGE UP (ref 0–0.2)
BASOPHILS NFR BLD AUTO: 0.1 % — SIGNIFICANT CHANGE UP (ref 0–2)
EOSINOPHIL # BLD AUTO: 0 K/UL — SIGNIFICANT CHANGE UP (ref 0–0.5)
EOSINOPHIL NFR BLD AUTO: 0 % — SIGNIFICANT CHANGE UP (ref 0–6)
HCT VFR BLD CALC: 33.1 % — LOW (ref 34.5–45)
HGB BLD-MCNC: 10.5 G/DL — LOW (ref 11.5–15.5)
IMM GRANULOCYTES NFR BLD AUTO: 1 % — SIGNIFICANT CHANGE UP (ref 0–1.5)
LYMPHOCYTES # BLD AUTO: 1.27 K/UL — SIGNIFICANT CHANGE UP (ref 1–3.3)
LYMPHOCYTES # BLD AUTO: 9.6 % — LOW (ref 13–44)
MCHC RBC-ENTMCNC: 19.2 PG — LOW (ref 27–34)
MCHC RBC-ENTMCNC: 31.7 G/DL — LOW (ref 32–36)
MCV RBC AUTO: 60.6 FL — LOW (ref 80–100)
MONOCYTES # BLD AUTO: 0.1 K/UL — SIGNIFICANT CHANGE UP (ref 0–0.9)
MONOCYTES NFR BLD AUTO: 0.8 % — LOW (ref 2–14)
NEUTROPHILS # BLD AUTO: 11.77 K/UL — HIGH (ref 1.8–7.4)
NEUTROPHILS NFR BLD AUTO: 88.5 % — HIGH (ref 43–77)
NRBC # BLD: 0 /100 WBCS — SIGNIFICANT CHANGE UP (ref 0–0)
PLATELET # BLD AUTO: 223 K/UL — SIGNIFICANT CHANGE UP (ref 150–400)
RBC # BLD: 5.46 M/UL — HIGH (ref 3.8–5.2)
RBC # FLD: 20.5 % — HIGH (ref 10.3–14.5)
WBC # BLD: 13.28 K/UL — HIGH (ref 3.8–10.5)
WBC # FLD AUTO: 13.28 K/UL — HIGH (ref 3.8–10.5)

## 2020-12-09 ENCOUNTER — NON-APPOINTMENT (OUTPATIENT)
Age: 70
End: 2020-12-09

## 2020-12-14 ENCOUNTER — APPOINTMENT (OUTPATIENT)
Dept: OTOLARYNGOLOGY | Facility: CLINIC | Age: 70
End: 2020-12-14
Payer: MEDICARE

## 2020-12-14 VITALS — SYSTOLIC BLOOD PRESSURE: 176 MMHG | HEART RATE: 120 BPM | DIASTOLIC BLOOD PRESSURE: 94 MMHG

## 2020-12-14 LAB
ALBUMIN SERPL ELPH-MCNC: 4.6 G/DL
ALP BLD-CCNC: 122 U/L
ALT SERPL-CCNC: 25 U/L
ANION GAP SERPL CALC-SCNC: 12 MMOL/L
AST SERPL-CCNC: 26 U/L
BILIRUB SERPL-MCNC: 0.3 MG/DL
BUN SERPL-MCNC: 18 MG/DL
CALCIUM SERPL-MCNC: 10 MG/DL
CANCER AG125 SERPL-ACNC: 23 U/ML
CEA SERPL-MCNC: 1.5 NG/ML
CHLORIDE SERPL-SCNC: 100 MMOL/L
CO2 SERPL-SCNC: 26 MMOL/L
CREAT SERPL-MCNC: 0.61 MG/DL
GLUCOSE SERPL-MCNC: 118 MG/DL
MAGNESIUM SERPL-MCNC: 1.5 MG/DL
POTASSIUM SERPL-SCNC: 4.5 MMOL/L
PROT SERPL-MCNC: 7 G/DL
SODIUM SERPL-SCNC: 138 MMOL/L

## 2020-12-14 PROCEDURE — 99214 OFFICE O/P EST MOD 30 MIN: CPT | Mod: 25

## 2020-12-14 PROCEDURE — 31575 DIAGNOSTIC LARYNGOSCOPY: CPT

## 2020-12-14 NOTE — PROCEDURE
[Gag Reflex] : gag reflex preventing mirror examination [None] : none [Flexible Endoscope] : examined with the flexible endoscope [Serial Number: ___] : Serial Number: [unfilled] [de-identified] : No lesions in the NPx, OPx, HPx or larynx. VC are mobile, airway patent.

## 2020-12-14 NOTE — CONSULT LETTER
[Dear  ___] : Dear  [unfilled], [Courtesy Letter:] : I had the pleasure of seeing your patient, [unfilled], in my office today. [Please see my note below.] : Please see my note below. [Consult Closing:] : Thank you very much for allowing me to participate in the care of this patient.  If you have any questions, please do not hesitate to contact me. [Sincerely,] : Sincerely, [FreeTextEntry2] : Dr. Shaun Hammer\par [address], [address]\[place] [address]  [FreeTextEntry3] : Dev

## 2020-12-14 NOTE — HISTORY OF PRESENT ILLNESS
[de-identified] : pt with asthma, arthritis and dx of Stage II FIGO Grade 1 endometrial ca and  refer by Dr. Hammer for abnormal finding on PET CT.  pet ct showed 2.  Indeterminate asymmetric FDG avidity left base of tongue/vallecula. Suggest correlation with direct inspection. pt Dr. Agosto did scope and per pt did not find anything. pt has no c.o at the throat or neck. pt also oral surgeon and did not find anything. pt is on chemo for endometrial cancer and doing well. pt still has no throat or neck c.o.

## 2020-12-15 ENCOUNTER — RESULT REVIEW (OUTPATIENT)
Age: 70
End: 2020-12-15

## 2020-12-15 ENCOUNTER — APPOINTMENT (OUTPATIENT)
Dept: HEMATOLOGY ONCOLOGY | Facility: CLINIC | Age: 70
End: 2020-12-15
Payer: MEDICARE

## 2020-12-15 VITALS
TEMPERATURE: 97.3 F | OXYGEN SATURATION: 96 % | SYSTOLIC BLOOD PRESSURE: 166 MMHG | DIASTOLIC BLOOD PRESSURE: 94 MMHG | HEART RATE: 105 BPM | HEIGHT: 62.99 IN | WEIGHT: 171.96 LBS | RESPIRATION RATE: 18 BRPM | BODY MASS INDEX: 30.47 KG/M2

## 2020-12-15 LAB
ALBUMIN SERPL ELPH-MCNC: 4.5 G/DL
ALP BLD-CCNC: 114 U/L
ALT SERPL-CCNC: 19 U/L
ANION GAP SERPL CALC-SCNC: 13 MMOL/L
AST SERPL-CCNC: 24 U/L
BASOPHILS # BLD AUTO: 0.02 K/UL — SIGNIFICANT CHANGE UP (ref 0–0.2)
BASOPHILS NFR BLD AUTO: 0.6 % — SIGNIFICANT CHANGE UP (ref 0–2)
BILIRUB SERPL-MCNC: 0.2 MG/DL
BUN SERPL-MCNC: 18 MG/DL
CALCIUM SERPL-MCNC: 9.7 MG/DL
CANCER AG125 SERPL-ACNC: 22 U/ML
CEA SERPL-MCNC: 1.9 NG/ML
CHLORIDE SERPL-SCNC: 100 MMOL/L
CO2 SERPL-SCNC: 27 MMOL/L
CREAT SERPL-MCNC: 0.76 MG/DL
EOSINOPHIL # BLD AUTO: 0 K/UL — SIGNIFICANT CHANGE UP (ref 0–0.5)
EOSINOPHIL NFR BLD AUTO: 0 % — SIGNIFICANT CHANGE UP (ref 0–6)
GLUCOSE SERPL-MCNC: 138 MG/DL
HCT VFR BLD CALC: 30.6 % — LOW (ref 34.5–45)
HGB BLD-MCNC: 9.5 G/DL — LOW (ref 11.5–15.5)
IMM GRANULOCYTES NFR BLD AUTO: 0.3 % — SIGNIFICANT CHANGE UP (ref 0–1.5)
LYMPHOCYTES # BLD AUTO: 1.67 K/UL — SIGNIFICANT CHANGE UP (ref 1–3.3)
LYMPHOCYTES # BLD AUTO: 47.3 % — HIGH (ref 13–44)
MCHC RBC-ENTMCNC: 19.4 PG — LOW (ref 27–34)
MCHC RBC-ENTMCNC: 31 G/DL — LOW (ref 32–36)
MCV RBC AUTO: 62.4 FL — LOW (ref 80–100)
MONOCYTES # BLD AUTO: 0.1 K/UL — SIGNIFICANT CHANGE UP (ref 0–0.9)
MONOCYTES NFR BLD AUTO: 2.8 % — SIGNIFICANT CHANGE UP (ref 2–14)
NEUTROPHILS # BLD AUTO: 1.73 K/UL — LOW (ref 1.8–7.4)
NEUTROPHILS NFR BLD AUTO: 49 % — SIGNIFICANT CHANGE UP (ref 43–77)
NRBC # BLD: 0 /100 WBCS — SIGNIFICANT CHANGE UP (ref 0–0)
PLATELET # BLD AUTO: 204 K/UL — SIGNIFICANT CHANGE UP (ref 150–400)
POTASSIUM SERPL-SCNC: 4.4 MMOL/L
PROT SERPL-MCNC: 6.9 G/DL
RBC # BLD: 4.9 M/UL — SIGNIFICANT CHANGE UP (ref 3.8–5.2)
RBC # FLD: 19 % — HIGH (ref 10.3–14.5)
SODIUM SERPL-SCNC: 141 MMOL/L
WBC # BLD: 3.53 K/UL — LOW (ref 3.8–10.5)
WBC # FLD AUTO: 3.53 K/UL — LOW (ref 3.8–10.5)

## 2020-12-15 PROCEDURE — 99214 OFFICE O/P EST MOD 30 MIN: CPT

## 2020-12-17 ENCOUNTER — APPOINTMENT (OUTPATIENT)
Dept: CT IMAGING | Facility: CLINIC | Age: 70
End: 2020-12-17
Payer: MEDICARE

## 2020-12-21 ENCOUNTER — APPOINTMENT (OUTPATIENT)
Dept: CT IMAGING | Facility: CLINIC | Age: 70
End: 2020-12-21
Payer: MEDICARE

## 2020-12-21 ENCOUNTER — OUTPATIENT (OUTPATIENT)
Dept: OUTPATIENT SERVICES | Facility: HOSPITAL | Age: 70
LOS: 1 days | End: 2020-12-21
Payer: MEDICARE

## 2020-12-21 DIAGNOSIS — Z98.890 OTHER SPECIFIED POSTPROCEDURAL STATES: Chronic | ICD-10-CM

## 2020-12-21 DIAGNOSIS — Z90.710 ACQUIRED ABSENCE OF BOTH CERVIX AND UTERUS: Chronic | ICD-10-CM

## 2020-12-21 DIAGNOSIS — Z90.49 ACQUIRED ABSENCE OF OTHER SPECIFIED PARTS OF DIGESTIVE TRACT: Chronic | ICD-10-CM

## 2020-12-21 DIAGNOSIS — N84.0 POLYP OF CORPUS UTERI: Chronic | ICD-10-CM

## 2020-12-21 DIAGNOSIS — D22.9 MELANOCYTIC NEVI, UNSPECIFIED: Chronic | ICD-10-CM

## 2020-12-21 DIAGNOSIS — C54.1 MALIGNANT NEOPLASM OF ENDOMETRIUM: ICD-10-CM

## 2020-12-21 DIAGNOSIS — M20.40 OTHER HAMMER TOE(S) (ACQUIRED), UNSPECIFIED FOOT: Chronic | ICD-10-CM

## 2020-12-21 DIAGNOSIS — Z90.89 ACQUIRED ABSENCE OF OTHER ORGANS: Chronic | ICD-10-CM

## 2020-12-21 DIAGNOSIS — Z96.659 PRESENCE OF UNSPECIFIED ARTIFICIAL KNEE JOINT: Chronic | ICD-10-CM

## 2020-12-21 PROBLEM — Z87.440 HISTORY OF URINARY TRACT INFECTION: Status: RESOLVED | Noted: 2019-12-26 | Resolved: 2020-12-21

## 2020-12-21 PROCEDURE — 74176 CT ABD & PELVIS W/O CONTRAST: CPT

## 2020-12-21 PROCEDURE — 71250 CT THORAX DX C-: CPT | Mod: 26

## 2020-12-21 PROCEDURE — 74176 CT ABD & PELVIS W/O CONTRAST: CPT | Mod: 26

## 2020-12-21 PROCEDURE — 71250 CT THORAX DX C-: CPT

## 2020-12-22 ENCOUNTER — OUTPATIENT (OUTPATIENT)
Dept: OUTPATIENT SERVICES | Facility: HOSPITAL | Age: 70
LOS: 1 days | Discharge: HOME | End: 2020-12-22

## 2020-12-22 DIAGNOSIS — Z90.49 ACQUIRED ABSENCE OF OTHER SPECIFIED PARTS OF DIGESTIVE TRACT: Chronic | ICD-10-CM

## 2020-12-22 DIAGNOSIS — Z98.890 OTHER SPECIFIED POSTPROCEDURAL STATES: Chronic | ICD-10-CM

## 2020-12-22 DIAGNOSIS — Z96.659 PRESENCE OF UNSPECIFIED ARTIFICIAL KNEE JOINT: Chronic | ICD-10-CM

## 2020-12-22 DIAGNOSIS — N84.0 POLYP OF CORPUS UTERI: Chronic | ICD-10-CM

## 2020-12-22 DIAGNOSIS — M20.40 OTHER HAMMER TOE(S) (ACQUIRED), UNSPECIFIED FOOT: Chronic | ICD-10-CM

## 2020-12-22 DIAGNOSIS — Z90.710 ACQUIRED ABSENCE OF BOTH CERVIX AND UTERUS: Chronic | ICD-10-CM

## 2020-12-22 DIAGNOSIS — C54.1 MALIGNANT NEOPLASM OF ENDOMETRIUM: ICD-10-CM

## 2020-12-22 DIAGNOSIS — D22.9 MELANOCYTIC NEVI, UNSPECIFIED: Chronic | ICD-10-CM

## 2020-12-22 DIAGNOSIS — Z90.89 ACQUIRED ABSENCE OF OTHER ORGANS: Chronic | ICD-10-CM

## 2020-12-29 ENCOUNTER — RESULT REVIEW (OUTPATIENT)
Age: 70
End: 2020-12-29

## 2020-12-29 ENCOUNTER — APPOINTMENT (OUTPATIENT)
Dept: HEMATOLOGY ONCOLOGY | Facility: CLINIC | Age: 70
End: 2020-12-29

## 2020-12-29 ENCOUNTER — APPOINTMENT (OUTPATIENT)
Dept: INFUSION THERAPY | Facility: HOSPITAL | Age: 70
End: 2020-12-29

## 2020-12-29 DIAGNOSIS — Z51.11 ENCOUNTER FOR ANTINEOPLASTIC CHEMOTHERAPY: ICD-10-CM

## 2020-12-29 DIAGNOSIS — R11.2 NAUSEA WITH VOMITING, UNSPECIFIED: ICD-10-CM

## 2020-12-29 LAB
BASOPHILS # BLD AUTO: 0.01 K/UL — SIGNIFICANT CHANGE UP (ref 0–0.2)
BASOPHILS NFR BLD AUTO: 0.1 % — SIGNIFICANT CHANGE UP (ref 0–2)
EOSINOPHIL # BLD AUTO: 0 K/UL — SIGNIFICANT CHANGE UP (ref 0–0.5)
EOSINOPHIL NFR BLD AUTO: 0 % — SIGNIFICANT CHANGE UP (ref 0–6)
HCT VFR BLD CALC: 33 % — LOW (ref 34.5–45)
HGB BLD-MCNC: 10.3 G/DL — LOW (ref 11.5–15.5)
IMM GRANULOCYTES NFR BLD AUTO: 1.2 % — SIGNIFICANT CHANGE UP (ref 0–1.5)
LYMPHOCYTES # BLD AUTO: 0.91 K/UL — LOW (ref 1–3.3)
LYMPHOCYTES # BLD AUTO: 11.2 % — LOW (ref 13–44)
MCHC RBC-ENTMCNC: 19.8 PG — LOW (ref 27–34)
MCHC RBC-ENTMCNC: 31.2 G/DL — LOW (ref 32–36)
MCV RBC AUTO: 63.3 FL — LOW (ref 80–100)
MONOCYTES # BLD AUTO: 0.04 K/UL — SIGNIFICANT CHANGE UP (ref 0–0.9)
MONOCYTES NFR BLD AUTO: 0.5 % — LOW (ref 2–14)
NEUTROPHILS # BLD AUTO: 7.09 K/UL — SIGNIFICANT CHANGE UP (ref 1.8–7.4)
NEUTROPHILS NFR BLD AUTO: 87 % — HIGH (ref 43–77)
NRBC # BLD: 0 /100 WBCS — SIGNIFICANT CHANGE UP (ref 0–0)
PLATELET # BLD AUTO: 186 K/UL — SIGNIFICANT CHANGE UP (ref 150–400)
RBC # BLD: 5.21 M/UL — HIGH (ref 3.8–5.2)
RBC # FLD: 22.4 % — HIGH (ref 10.3–14.5)
WBC # BLD: 8.15 K/UL — SIGNIFICANT CHANGE UP (ref 3.8–10.5)
WBC # FLD AUTO: 8.15 K/UL — SIGNIFICANT CHANGE UP (ref 3.8–10.5)

## 2020-12-29 NOTE — ASSESSMENT
[Palliative] : Goals of care discussed with patient: Palliative [Palliative Care Plan] : not applicable at this time [FreeTextEntry1] : 70 year old woman with relapsed EMCAon chemotherapy with Carboplatin and Taxol.\par She has completed 3 cycles, will get scans to evaluate treatment response.\par She will call office for scan results prior to next cycle.\par Labs today.\par Arthralgias - continue Tylenol PRN, will try Claritin with next cycle.\par Constipation - recommended bowel regimen with laxative and stool softener PRN.

## 2020-12-29 NOTE — HISTORY OF PRESENT ILLNESS
[Disease: _____________________] : Disease: [unfilled] [AJCC Stage: ____] : AJCC Stage: [unfilled] [de-identified] : 70F hx of asthma, arthritis and dx of Stage II FIGO Grade 1 endometrial ca presenting for initial consultation for recurrent metastatic disease.\par \par Prior onc care and surgery were performed in [place]. Pt was initially diagnosed in 2016 after she presented with PMB. On 1/4/17, she underwent a laparoscopic hysterectomy and bilateral salpingo-oophorectomy (Dr. Combs- [place]); there was no node dissection. There was extensive ASIF due to prior surgery. Pathology: endometrioid adenocarcinoma FIGO grade 1 with villoglandular features. The carcinoma superficially invaded the myometrium. Depth of myometrial invasion was less then 50%. Negative for lymphovascular invasion. Carcinoma extensively involved the endocervical glands with cervical mucosal and stromal invasion. Peritoneal cytology was benign. (Clinical stage II FIGO grade 1). In 4/2017, pt completed vaginal brachytherapy (Dr. Manolo Del Real -[place]).\par Since then, pt has been following with Dr. Russ from Gyn/Onc.\par She underwent a PET /CT 8/31/20, which showed increase in size and number of lung nodules, measuring up to 1 cm in RUL, 0.9cm in JORGE LUIS; suspicious for metastatic disease. There was also asymmetric FDG avidity of the left base of tongue/vallecula for which pt will obtain an ENT eval.\par On 9/17/20, pt underwent R Vats with wedge resection x 3 per CT surgery.  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 [de-identified] : Adenoca [FreeTextEntry1] : Carboplatin and Taxol\par C1 - 10/27/20\par C2 -11/17/20\par C3 - 12/8/20 [de-identified] : Patient is here for follow up after third cycle of chemo.  She had more fatigue and arthralgias with this cycle.  She also had increased constipation.  She is feeling better today.  No numbness.tingling/burning sensations.

## 2020-12-31 LAB — 25(OH)D3 SERPL-MCNC: 47 NG/ML

## 2021-01-06 ENCOUNTER — APPOINTMENT (OUTPATIENT)
Dept: HEMATOLOGY ONCOLOGY | Facility: CLINIC | Age: 71
End: 2021-01-06
Payer: MEDICARE

## 2021-01-06 ENCOUNTER — APPOINTMENT (OUTPATIENT)
Dept: THORACIC SURGERY | Facility: CLINIC | Age: 71
End: 2021-01-06
Payer: MEDICARE

## 2021-01-06 ENCOUNTER — RESULT REVIEW (OUTPATIENT)
Age: 71
End: 2021-01-06

## 2021-01-06 VITALS
OXYGEN SATURATION: 97 % | BODY MASS INDEX: 32.67 KG/M2 | SYSTOLIC BLOOD PRESSURE: 153 MMHG | TEMPERATURE: 97.9 F | DIASTOLIC BLOOD PRESSURE: 91 MMHG | WEIGHT: 173.06 LBS | RESPIRATION RATE: 18 BRPM | HEART RATE: 101 BPM | HEIGHT: 61 IN

## 2021-01-06 VITALS
SYSTOLIC BLOOD PRESSURE: 177 MMHG | HEART RATE: 107 BPM | OXYGEN SATURATION: 95 % | BODY MASS INDEX: 30.91 KG/M2 | DIASTOLIC BLOOD PRESSURE: 95 MMHG | WEIGHT: 168 LBS | HEIGHT: 62 IN

## 2021-01-06 LAB
BASOPHILS # BLD AUTO: 0 K/UL — SIGNIFICANT CHANGE UP (ref 0–0.2)
BASOPHILS NFR BLD AUTO: 0 % — SIGNIFICANT CHANGE UP (ref 0–2)
EOSINOPHIL # BLD AUTO: 0 K/UL — SIGNIFICANT CHANGE UP (ref 0–0.5)
EOSINOPHIL NFR BLD AUTO: 0 % — SIGNIFICANT CHANGE UP (ref 0–6)
HCT VFR BLD CALC: 27.2 % — LOW (ref 34.5–45)
HGB BLD-MCNC: 8.6 G/DL — LOW (ref 11.5–15.5)
LYMPHOCYTES # BLD AUTO: 1.63 K/UL — SIGNIFICANT CHANGE UP (ref 1–3.3)
LYMPHOCYTES # BLD AUTO: 38 % — SIGNIFICANT CHANGE UP (ref 13–44)
MCHC RBC-ENTMCNC: 20.5 PG — LOW (ref 27–34)
MCHC RBC-ENTMCNC: 31.6 G/DL — LOW (ref 32–36)
MCV RBC AUTO: 64.8 FL — LOW (ref 80–100)
MONOCYTES # BLD AUTO: 0.21 K/UL — SIGNIFICANT CHANGE UP (ref 0–0.9)
MONOCYTES NFR BLD AUTO: 5 % — SIGNIFICANT CHANGE UP (ref 2–14)
NEUTROPHILS # BLD AUTO: 2.45 K/UL — SIGNIFICANT CHANGE UP (ref 1.8–7.4)
NEUTROPHILS NFR BLD AUTO: 57 % — SIGNIFICANT CHANGE UP (ref 43–77)
NRBC # BLD: 0 /100 — SIGNIFICANT CHANGE UP (ref 0–0)
NRBC # BLD: SIGNIFICANT CHANGE UP /100 WBCS (ref 0–0)
PLAT MORPH BLD: NORMAL — SIGNIFICANT CHANGE UP
PLATELET # BLD AUTO: 223 K/UL — SIGNIFICANT CHANGE UP (ref 150–400)
RBC # BLD: 4.2 M/UL — SIGNIFICANT CHANGE UP (ref 3.8–5.2)
RBC # FLD: 20.9 % — HIGH (ref 10.3–14.5)
RBC BLD AUTO: SIGNIFICANT CHANGE UP
WBC # BLD: 4.29 K/UL — SIGNIFICANT CHANGE UP (ref 3.8–10.5)
WBC # FLD AUTO: 4.29 K/UL — SIGNIFICANT CHANGE UP (ref 3.8–10.5)

## 2021-01-06 PROCEDURE — 99215 OFFICE O/P EST HI 40 MIN: CPT

## 2021-01-06 PROCEDURE — 99214 OFFICE O/P EST MOD 30 MIN: CPT

## 2021-01-06 NOTE — ASSESSMENT
[FreeTextEntry1] : 70 year old female s/p multiple wedge resections for metastatic endometrial carcinoma. The most recent Ct of the chest show stable nodules.  No indications for additional thoracic surgery at this time.  I have asked the patient to follow up with me on a prn basis.

## 2021-01-06 NOTE — PHYSICAL EXAM
[Sclera] : the sclera and conjunctiva were normal [Neck Appearance] : the appearance of the neck was normal [] : the neck was supple [Auscultation Breath Sounds / Voice Sounds] : lungs were clear to auscultation bilaterally [Heart Rate And Rhythm] : heart rate was normal and rhythm regular [Examination Of The Chest] : the chest was normal in appearance [Edema] : there was no peripheral edema [Bowel Sounds] : normal bowel sounds [Cervical Lymph Nodes Enlarged Posterior Bilaterally] : posterior cervical [Cervical Lymph Nodes Enlarged Anterior Bilaterally] : anterior cervical [No CVA Tenderness] : no ~M costovertebral angle tenderness [No Spinal Tenderness] : no spinal tenderness [Nail Clubbing] : no clubbing  or cyanosis of the fingernails [Skin Color & Pigmentation] : normal skin color and pigmentation [No Focal Deficits] : no focal deficits [Oriented To Time, Place, And Person] : oriented to person, place, and time [Impaired Insight] : insight and judgment were intact [Affect] : the affect was normal

## 2021-01-06 NOTE — CONSULT LETTER
[Dear  ___] : Dear  [unfilled], [Courtesy Letter:] : I had the pleasure of seeing your patient, [unfilled], in my office today. [Consult Closing:] : Thank you very much for allowing me to participate in the care of this patient.  If you have any questions, please do not hesitate to contact me. [Sincerely,] : Sincerely, [( Thank you for referring [unfilled] for consultation for _____ )] : Thank you for referring [unfilled] for consultation for [unfilled] [DrLisa  ___] : Dr. FLYNN [FreeTextEntry2] : Dr. Lawton\par Dr. Juan (PCP) [FreeTextEntry3] : Maciel Sotelo

## 2021-01-06 NOTE — HISTORY OF PRESENT ILLNESS
[FreeTextEntry1] : 70 year old female with a history of endometrial carcinoma who is s/p resection of multiple lung lesions on 9/17/20 who presents for follow up. The patient is currently undergoing chemotherapy.  She states that her bra is irritating her chest incision but that she otherwise feels well and is without complaints

## 2021-01-11 NOTE — HISTORY OF PRESENT ILLNESS
[Disease: _____________________] : Disease: [unfilled] [AJCC Stage: ____] : AJCC Stage: [unfilled] [de-identified] : 70F hx of asthma, arthritis and dx of Stage II FIGO Grade 1 endometrial ca presenting for initial consultation for recurrent metastatic disease.\par \par Prior onc care and surgery were performed in [place]. Pt was initially diagnosed in 2016 after she presented with PMB. On 1/4/17, she underwent a laparoscopic hysterectomy and bilateral salpingo-oophorectomy (Dr. Combs- [place]); there was no node dissection. There was extensive ASIF due to prior surgery. Pathology: endometrioid adenocarcinoma FIGO grade 1 with villoglandular features. The carcinoma superficially invaded the myometrium. Depth of myometrial invasion was less then 50%. Negative for lymphovascular invasion. Carcinoma extensively involved the endocervical glands with cervical mucosal and stromal invasion. Peritoneal cytology was benign. (Clinical stage II FIGO grade 1). In 4/2017, pt completed vaginal brachytherapy (Dr. Manolo Del Real -[place]).\par Since then, pt has been following with Dr. Russ from Gyn/Onc.\par She underwent a PET /CT 8/31/20, which showed increase in size and number of lung nodules, measuring up to 1 cm in RUL, 0.9cm in JORGE LUIS; suspicious for metastatic disease. There was also asymmetric FDG avidity of the left base of tongue/vallecula for which pt will obtain an ENT eval.\par On 9/17/20, pt underwent R Vats with wedge resection x 3 per CT surgery. 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 \par \par  [Treatment Protocol] : Treatment Protocol [FreeTextEntry1] :  Carboplatin and Taxol\par C1 - 10/27/20. \par C2 -11/17/20\par C3 - 12/8/20. \par C4- 12/29/2020\par  [de-identified] : Patient tolerating treatment well. She has some fatigue,and constipation.\par She has a good appetite. No bladder issues. She still get some arthralgias, self llimiting.\par

## 2021-01-11 NOTE — HISTORY OF PRESENT ILLNESS
[Disease: _____________________] : Disease: [unfilled] [AJCC Stage: ____] : AJCC Stage: [unfilled] [de-identified] : 70F hx of asthma, arthritis and dx of Stage II FIGO Grade 1 endometrial ca presenting for initial consultation for recurrent metastatic disease.\par \par Prior onc care and surgery were performed in [place]. Pt was initially diagnosed in 2016 after she presented with PMB. On 1/4/17, she underwent a laparoscopic hysterectomy and bilateral salpingo-oophorectomy (Dr. Combs- [place]); there was no node dissection. There was extensive ASIF due to prior surgery. Pathology: endometrioid adenocarcinoma FIGO grade 1 with villoglandular features. The carcinoma superficially invaded the myometrium. Depth of myometrial invasion was less then 50%. Negative for lymphovascular invasion. Carcinoma extensively involved the endocervical glands with cervical mucosal and stromal invasion. Peritoneal cytology was benign. (Clinical stage II FIGO grade 1). In 4/2017, pt completed vaginal brachytherapy (Dr. Manolo Del Real -[place]).\par Since then, pt has been following with Dr. Russ from Gyn/Onc.\par She underwent a PET /CT 8/31/20, which showed increase in size and number of lung nodules, measuring up to 1 cm in RUL, 0.9cm in JORGE LUIS; suspicious for metastatic disease. There was also asymmetric FDG avidity of the left base of tongue/vallecula for which pt will obtain an ENT eval.\par On 9/17/20, pt underwent R Vats with wedge resection x 3 per CT surgery. 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 \par \par  [Treatment Protocol] : Treatment Protocol [FreeTextEntry1] :  Carboplatin and Taxol\par C1 - 10/27/20. \par C2 -11/17/20\par C3 - 12/8/20. \par C4- 12/29/2020\par  [de-identified] : Patient tolerating treatment well. She has some fatigue,and constipation.\par She has a good appetite. No bladder issues. She still get some arthralgias, self llimiting.\par

## 2021-01-11 NOTE — HISTORY OF PRESENT ILLNESS
[Disease: _____________________] : Disease: [unfilled] [AJCC Stage: ____] : AJCC Stage: [unfilled] [de-identified] : 70F hx of asthma, arthritis and dx of Stage II FIGO Grade 1 endometrial ca presenting for initial consultation for recurrent metastatic disease.\par \par Prior onc care and surgery were performed in [place]. Pt was initially diagnosed in 2016 after she presented with PMB. On 1/4/17, she underwent a laparoscopic hysterectomy and bilateral salpingo-oophorectomy (Dr. Combs- [place]); there was no node dissection. There was extensive ASIF due to prior surgery. Pathology: endometrioid adenocarcinoma FIGO grade 1 with villoglandular features. The carcinoma superficially invaded the myometrium. Depth of myometrial invasion was less then 50%. Negative for lymphovascular invasion. Carcinoma extensively involved the endocervical glands with cervical mucosal and stromal invasion. Peritoneal cytology was benign. (Clinical stage II FIGO grade 1). In 4/2017, pt completed vaginal brachytherapy (Dr. Manolo Del Real -[place]).\par Since then, pt has been following with Dr. Russ from Gyn/Onc.\par She underwent a PET /CT 8/31/20, which showed increase in size and number of lung nodules, measuring up to 1 cm in RUL, 0.9cm in JORGE LUIS; suspicious for metastatic disease. There was also asymmetric FDG avidity of the left base of tongue/vallecula for which pt will obtain an ENT eval.\par On 9/17/20, pt underwent R Vats with wedge resection x 3 per CT surgery. 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 \par \par  [Treatment Protocol] : Treatment Protocol [FreeTextEntry1] :  Carboplatin and Taxol\par C1 - 10/27/20. \par C2 -11/17/20\par C3 - 12/8/20. \par C4- 12/29/2020\par  [de-identified] : Patient tolerating treatment well. She has some fatigue,and constipation.\par She has a good appetite. No bladder issues. She still get some arthralgias, self llimiting.\par

## 2021-01-15 ENCOUNTER — APPOINTMENT (OUTPATIENT)
Dept: DISASTER EMERGENCY | Facility: CLINIC | Age: 71
End: 2021-01-15

## 2021-01-17 LAB — SARS-COV-2 N GENE NPH QL NAA+PROBE: NOT DETECTED

## 2021-01-19 ENCOUNTER — RESULT REVIEW (OUTPATIENT)
Age: 71
End: 2021-01-19

## 2021-01-19 ENCOUNTER — APPOINTMENT (OUTPATIENT)
Dept: INFUSION THERAPY | Facility: HOSPITAL | Age: 71
End: 2021-01-19

## 2021-01-19 LAB
BASOPHILS # BLD AUTO: 0 K/UL — SIGNIFICANT CHANGE UP (ref 0–0.2)
BASOPHILS NFR BLD AUTO: 0 % — SIGNIFICANT CHANGE UP (ref 0–2)
EOSINOPHIL # BLD AUTO: 0 K/UL — SIGNIFICANT CHANGE UP (ref 0–0.5)
EOSINOPHIL NFR BLD AUTO: 0 % — SIGNIFICANT CHANGE UP (ref 0–6)
HCT VFR BLD CALC: 30.2 % — LOW (ref 34.5–45)
HGB BLD-MCNC: 9.6 G/DL — LOW (ref 11.5–15.5)
LYMPHOCYTES # BLD AUTO: 0.73 K/UL — LOW (ref 1–3.3)
LYMPHOCYTES # BLD AUTO: 9 % — LOW (ref 13–44)
MCHC RBC-ENTMCNC: 20.6 PG — LOW (ref 27–34)
MCHC RBC-ENTMCNC: 31.8 G/DL — LOW (ref 32–36)
MCV RBC AUTO: 64.8 FL — LOW (ref 80–100)
MONOCYTES # BLD AUTO: 0.16 K/UL — SIGNIFICANT CHANGE UP (ref 0–0.9)
MONOCYTES NFR BLD AUTO: 2 % — SIGNIFICANT CHANGE UP (ref 2–14)
NEUTROPHILS # BLD AUTO: 7.23 K/UL — SIGNIFICANT CHANGE UP (ref 1.8–7.4)
NEUTROPHILS NFR BLD AUTO: 89 % — HIGH (ref 43–77)
NRBC # BLD: 0 /100 — SIGNIFICANT CHANGE UP (ref 0–0)
NRBC # BLD: SIGNIFICANT CHANGE UP /100 WBCS (ref 0–0)
PLAT MORPH BLD: NORMAL — SIGNIFICANT CHANGE UP
PLATELET # BLD AUTO: 157 K/UL — SIGNIFICANT CHANGE UP (ref 150–400)
RBC # BLD: 4.66 M/UL — SIGNIFICANT CHANGE UP (ref 3.8–5.2)
RBC # FLD: 23.5 % — HIGH (ref 10.3–14.5)
RBC BLD AUTO: SIGNIFICANT CHANGE UP
WBC # BLD: 8.12 K/UL — SIGNIFICANT CHANGE UP (ref 3.8–10.5)
WBC # FLD AUTO: 8.12 K/UL — SIGNIFICANT CHANGE UP (ref 3.8–10.5)

## 2021-01-20 ENCOUNTER — NON-APPOINTMENT (OUTPATIENT)
Age: 71
End: 2021-01-20

## 2021-01-22 ENCOUNTER — OUTPATIENT (OUTPATIENT)
Dept: OUTPATIENT SERVICES | Facility: HOSPITAL | Age: 71
LOS: 1 days | Discharge: HOME | End: 2021-01-22

## 2021-01-22 DIAGNOSIS — Z98.890 OTHER SPECIFIED POSTPROCEDURAL STATES: Chronic | ICD-10-CM

## 2021-01-22 DIAGNOSIS — N84.0 POLYP OF CORPUS UTERI: Chronic | ICD-10-CM

## 2021-01-22 DIAGNOSIS — Z90.89 ACQUIRED ABSENCE OF OTHER ORGANS: Chronic | ICD-10-CM

## 2021-01-22 DIAGNOSIS — M20.40 OTHER HAMMER TOE(S) (ACQUIRED), UNSPECIFIED FOOT: Chronic | ICD-10-CM

## 2021-01-22 DIAGNOSIS — C54.1 MALIGNANT NEOPLASM OF ENDOMETRIUM: ICD-10-CM

## 2021-01-22 DIAGNOSIS — Z96.659 PRESENCE OF UNSPECIFIED ARTIFICIAL KNEE JOINT: Chronic | ICD-10-CM

## 2021-01-22 DIAGNOSIS — D22.9 MELANOCYTIC NEVI, UNSPECIFIED: Chronic | ICD-10-CM

## 2021-01-22 DIAGNOSIS — Z90.710 ACQUIRED ABSENCE OF BOTH CERVIX AND UTERUS: Chronic | ICD-10-CM

## 2021-01-22 DIAGNOSIS — Z90.49 ACQUIRED ABSENCE OF OTHER SPECIFIED PARTS OF DIGESTIVE TRACT: Chronic | ICD-10-CM

## 2021-01-22 LAB
ALBUMIN SERPL ELPH-MCNC: 4.5 G/DL
ALP BLD-CCNC: 107 U/L
ALT SERPL-CCNC: 17 U/L
ANION GAP SERPL CALC-SCNC: 10 MMOL/L
AST SERPL-CCNC: 22 U/L
BILIRUB SERPL-MCNC: 0.2 MG/DL
BUN SERPL-MCNC: 15 MG/DL
CALCIUM SERPL-MCNC: 9.8 MG/DL
CANCER AG125 SERPL-ACNC: 19 U/ML
CEA SERPL-MCNC: 1.7 NG/ML
CHLORIDE SERPL-SCNC: 99 MMOL/L
CO2 SERPL-SCNC: 29 MMOL/L
CREAT SERPL-MCNC: 0.79 MG/DL
GLUCOSE SERPL-MCNC: 113 MG/DL
POTASSIUM SERPL-SCNC: 4.6 MMOL/L
PROT SERPL-MCNC: 6.8 G/DL
SODIUM SERPL-SCNC: 139 MMOL/L

## 2021-01-26 ENCOUNTER — APPOINTMENT (OUTPATIENT)
Dept: HEMATOLOGY ONCOLOGY | Facility: CLINIC | Age: 71
End: 2021-01-26
Payer: MEDICARE

## 2021-01-26 ENCOUNTER — RESULT REVIEW (OUTPATIENT)
Age: 71
End: 2021-01-26

## 2021-01-26 VITALS
TEMPERATURE: 98 F | HEART RATE: 100 BPM | BODY MASS INDEX: 32.67 KG/M2 | RESPIRATION RATE: 16 BRPM | WEIGHT: 173.06 LBS | OXYGEN SATURATION: 98 % | SYSTOLIC BLOOD PRESSURE: 169 MMHG | DIASTOLIC BLOOD PRESSURE: 93 MMHG | HEIGHT: 61.02 IN

## 2021-01-26 LAB
BASOPHILS # BLD AUTO: 0 K/UL — SIGNIFICANT CHANGE UP (ref 0–0.2)
BASOPHILS NFR BLD AUTO: 0 % — SIGNIFICANT CHANGE UP (ref 0–2)
EOSINOPHIL # BLD AUTO: 0 K/UL — SIGNIFICANT CHANGE UP (ref 0–0.5)
EOSINOPHIL NFR BLD AUTO: 0 % — SIGNIFICANT CHANGE UP (ref 0–6)
HCT VFR BLD CALC: 26.9 % — LOW (ref 34.5–45)
HGB BLD-MCNC: 8.5 G/DL — LOW (ref 11.5–15.5)
LYMPHOCYTES # BLD AUTO: 1.61 K/UL — SIGNIFICANT CHANGE UP (ref 1–3.3)
LYMPHOCYTES # BLD AUTO: 46 % — HIGH (ref 13–44)
MCHC RBC-ENTMCNC: 21.1 PG — LOW (ref 27–34)
MCHC RBC-ENTMCNC: 31.6 G/DL — LOW (ref 32–36)
MCV RBC AUTO: 66.9 FL — LOW (ref 80–100)
MONOCYTES # BLD AUTO: 0 K/UL — SIGNIFICANT CHANGE UP (ref 0–0.9)
MONOCYTES NFR BLD AUTO: 0 % — LOW (ref 2–14)
NEUTROPHILS # BLD AUTO: 1.89 K/UL — SIGNIFICANT CHANGE UP (ref 1.8–7.4)
NEUTROPHILS NFR BLD AUTO: 54 % — SIGNIFICANT CHANGE UP (ref 43–77)
NRBC # BLD: 0 /100 — SIGNIFICANT CHANGE UP (ref 0–0)
NRBC # BLD: SIGNIFICANT CHANGE UP /100 WBCS (ref 0–0)
PLAT MORPH BLD: NORMAL — SIGNIFICANT CHANGE UP
PLATELET # BLD AUTO: 136 K/UL — LOW (ref 150–400)
RBC # BLD: 4.02 M/UL — SIGNIFICANT CHANGE UP (ref 3.8–5.2)
RBC # FLD: 21.4 % — HIGH (ref 10.3–14.5)
RBC BLD AUTO: SIGNIFICANT CHANGE UP
WBC # BLD: 3.5 K/UL — LOW (ref 3.8–10.5)
WBC # FLD AUTO: 3.5 K/UL — LOW (ref 3.8–10.5)

## 2021-01-26 PROCEDURE — 99214 OFFICE O/P EST MOD 30 MIN: CPT

## 2021-01-28 LAB
ALBUMIN SERPL ELPH-MCNC: 4.4 G/DL
ALP BLD-CCNC: 101 U/L
ALT SERPL-CCNC: 21 U/L
ANION GAP SERPL CALC-SCNC: 11 MMOL/L
AST SERPL-CCNC: 30 U/L
BILIRUB SERPL-MCNC: 0.2 MG/DL
BUN SERPL-MCNC: 14 MG/DL
CALCIUM SERPL-MCNC: 9.5 MG/DL
CANCER AG125 SERPL-ACNC: 20 U/ML
CEA SERPL-MCNC: 1.9 NG/ML
CHLORIDE SERPL-SCNC: 100 MMOL/L
CO2 SERPL-SCNC: 26 MMOL/L
CREAT SERPL-MCNC: 0.71 MG/DL
GLUCOSE SERPL-MCNC: 111 MG/DL
MAGNESIUM SERPL-MCNC: 1.5 MG/DL
POTASSIUM SERPL-SCNC: 4.9 MMOL/L
PROT SERPL-MCNC: 6.4 G/DL
SODIUM SERPL-SCNC: 138 MMOL/L

## 2021-01-28 NOTE — REVIEW OF SYSTEMS
[Negative] : Allergic/Immunologic [Fatigue] : fatigue [Constipation] : constipation [Joint Pain] : joint pain [FreeTextEntry7] : nausea

## 2021-01-28 NOTE — HISTORY OF PRESENT ILLNESS
[Disease: _____________________] : Disease: [unfilled] [AJCC Stage: ____] : AJCC Stage: [unfilled] [Treatment Protocol] : Treatment Protocol [de-identified] : 70F hx of asthma, arthritis and dx of Stage II FIGO Grade 1 endometrial ca presenting for initial consultation for recurrent metastatic disease.\par \par Prior onc care and surgery were performed in [place]. Pt was initially diagnosed in 2016 after she presented with PMB. On 1/4/17, she underwent a laparoscopic hysterectomy and bilateral salpingo-oophorectomy (Dr. Combs- [place]); there was no node dissection. There was extensive ASIF due to prior surgery. Pathology: endometrioid adenocarcinoma FIGO grade 1 with villoglandular features. The carcinoma superficially invaded the myometrium. Depth of myometrial invasion was less then 50%. Negative for lymphovascular invasion. Carcinoma extensively involved the endocervical glands with cervical mucosal and stromal invasion. Peritoneal cytology was benign. (Clinical stage II FIGO grade 1). In 4/2017, pt completed vaginal brachytherapy (Dr. Manolo Del Real -[place]).\par Since then, pt has been following with Dr. Russ from Gyn/Onc.\par She underwent a PET /CT 8/31/20, which showed increase in size and number of lung nodules, measuring up to 1 cm in RUL, 0.9cm in JORGE LUIS; suspicious for metastatic disease. There was also asymmetric FDG avidity of the left base of tongue/vallecula for which pt will obtain an ENT eval.\par On 9/17/20, pt underwent R Vats with wedge resection x 3 per CT surgery. 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 \par \par  [FreeTextEntry1] :  Carboplatin and Taxol\par C1 - 10/27/20. \par C2 -11/17/20\par C3 - 12/8/20. \par C4- 12/29/2020\par C5 - 1/19/21\par  [de-identified] : Patient is here for follow up after completing her 5th cycle of chemo.  She reports increased fatigue and weakness.  She also had more nausea with this cycle, using Zofran 4mg ODT for relief but is unable to use it for first 5 days after chemo and does not get relief from Compazine.  She also has increased constipation, added prune juice to her bowel regimen as per nutritionist which has helped.  Appetite is stable and she is maintaining her weight.  Denies fever, chills, chest pain, SOB, abdominal pain, bloating, vomiting, diarrhea, bleeding, swelling. Arthralgias are stable.

## 2021-01-28 NOTE — ASSESSMENT
[Palliative] : Goals of care discussed with patient: Palliative [Palliative Care Plan] : not applicable at this time [FreeTextEntry1] : 70 year old woman with relapsed EMCA on chemotherapy with Carboplatin and Taxol.\par She has completed 5 cycles.\par Increased weakness with this cycle.\par Will repeat scans after next cycle to evaluate response to chemo.\par Nausea- will change compazine to reglan post treatment.\par RTC 3 weeks.

## 2021-02-09 ENCOUNTER — RESULT REVIEW (OUTPATIENT)
Age: 71
End: 2021-02-09

## 2021-02-09 ENCOUNTER — APPOINTMENT (OUTPATIENT)
Dept: INFUSION THERAPY | Facility: HOSPITAL | Age: 71
End: 2021-02-09

## 2021-02-09 DIAGNOSIS — Z51.11 ENCOUNTER FOR ANTINEOPLASTIC CHEMOTHERAPY: ICD-10-CM

## 2021-02-09 DIAGNOSIS — R11.2 NAUSEA WITH VOMITING, UNSPECIFIED: ICD-10-CM

## 2021-02-09 LAB
BASOPHILS # BLD AUTO: 0.01 K/UL — SIGNIFICANT CHANGE UP (ref 0–0.2)
BASOPHILS NFR BLD AUTO: 0.1 % — SIGNIFICANT CHANGE UP (ref 0–2)
EOSINOPHIL # BLD AUTO: 0 K/UL — SIGNIFICANT CHANGE UP (ref 0–0.5)
EOSINOPHIL NFR BLD AUTO: 0 % — SIGNIFICANT CHANGE UP (ref 0–6)
HCT VFR BLD CALC: 30.7 % — LOW (ref 34.5–45)
HGB BLD-MCNC: 9.7 G/DL — LOW (ref 11.5–15.5)
IMM GRANULOCYTES NFR BLD AUTO: 0.6 % — SIGNIFICANT CHANGE UP (ref 0–1.5)
LYMPHOCYTES # BLD AUTO: 0.81 K/UL — LOW (ref 1–3.3)
LYMPHOCYTES # BLD AUTO: 9.8 % — LOW (ref 13–44)
MCHC RBC-ENTMCNC: 21.6 PG — LOW (ref 27–34)
MCHC RBC-ENTMCNC: 31.6 G/DL — LOW (ref 32–36)
MCV RBC AUTO: 68.2 FL — LOW (ref 80–100)
MONOCYTES # BLD AUTO: 0.14 K/UL — SIGNIFICANT CHANGE UP (ref 0–0.9)
MONOCYTES NFR BLD AUTO: 1.7 % — LOW (ref 2–14)
NEUTROPHILS # BLD AUTO: 7.22 K/UL — SIGNIFICANT CHANGE UP (ref 1.8–7.4)
NEUTROPHILS NFR BLD AUTO: 87.8 % — HIGH (ref 43–77)
NRBC # BLD: 0 /100 WBCS — SIGNIFICANT CHANGE UP (ref 0–0)
PLATELET # BLD AUTO: 189 K/UL — SIGNIFICANT CHANGE UP (ref 150–400)
RBC # BLD: 4.5 M/UL — SIGNIFICANT CHANGE UP (ref 3.8–5.2)
RBC # FLD: 23.4 % — HIGH (ref 10.3–14.5)
WBC # BLD: 8.23 K/UL — SIGNIFICANT CHANGE UP (ref 3.8–10.5)
WBC # FLD AUTO: 8.23 K/UL — SIGNIFICANT CHANGE UP (ref 3.8–10.5)

## 2021-02-09 RX ORDER — UBIDECARENONE 100 MG
1 CAPSULE ORAL
Qty: 0 | Refills: 0 | DISCHARGE

## 2021-02-09 RX ORDER — ACETAMINOPHEN 500 MG
2 TABLET ORAL
Qty: 0 | Refills: 0 | DISCHARGE

## 2021-02-09 RX ORDER — ASCORBIC ACID 60 MG
0 TABLET,CHEWABLE ORAL
Qty: 0 | Refills: 0 | DISCHARGE

## 2021-02-09 RX ORDER — MONTELUKAST 4 MG/1
0 TABLET, CHEWABLE ORAL
Qty: 0 | Refills: 0 | DISCHARGE

## 2021-02-09 RX ORDER — CHOLECALCIFEROL (VITAMIN D3) 125 MCG
6000 CAPSULE ORAL
Qty: 0 | Refills: 0 | DISCHARGE

## 2021-02-09 RX ORDER — ALBUTEROL 90 UG/1
0 AEROSOL, METERED ORAL
Qty: 0 | Refills: 0 | DISCHARGE

## 2021-02-09 RX ORDER — LUTEIN 20 MG
1 CAPSULE ORAL
Qty: 0 | Refills: 0 | DISCHARGE

## 2021-02-16 ENCOUNTER — RESULT REVIEW (OUTPATIENT)
Age: 71
End: 2021-02-16

## 2021-02-16 ENCOUNTER — APPOINTMENT (OUTPATIENT)
Dept: HEMATOLOGY ONCOLOGY | Facility: CLINIC | Age: 71
End: 2021-02-16
Payer: MEDICARE

## 2021-02-16 VITALS
SYSTOLIC BLOOD PRESSURE: 165 MMHG | HEIGHT: 61 IN | RESPIRATION RATE: 16 BRPM | OXYGEN SATURATION: 99 % | HEART RATE: 108 BPM | DIASTOLIC BLOOD PRESSURE: 93 MMHG | BODY MASS INDEX: 32.47 KG/M2 | TEMPERATURE: 97.2 F | WEIGHT: 171.96 LBS

## 2021-02-16 LAB
BASOPHILS # BLD AUTO: 0 K/UL — SIGNIFICANT CHANGE UP (ref 0–0.2)
BASOPHILS NFR BLD AUTO: 0 % — SIGNIFICANT CHANGE UP (ref 0–2)
EOSINOPHIL # BLD AUTO: 0 K/UL — SIGNIFICANT CHANGE UP (ref 0–0.5)
EOSINOPHIL NFR BLD AUTO: 0 % — SIGNIFICANT CHANGE UP (ref 0–6)
HCT VFR BLD CALC: 26.8 % — LOW (ref 34.5–45)
HGB BLD-MCNC: 8.5 G/DL — LOW (ref 11.5–15.5)
LYMPHOCYTES # BLD AUTO: 1.22 K/UL — SIGNIFICANT CHANGE UP (ref 1–3.3)
LYMPHOCYTES # BLD AUTO: 46 % — HIGH (ref 13–44)
MCHC RBC-ENTMCNC: 21.9 PG — LOW (ref 27–34)
MCHC RBC-ENTMCNC: 31.7 G/DL — LOW (ref 32–36)
MCV RBC AUTO: 68.9 FL — LOW (ref 80–100)
MONOCYTES # BLD AUTO: 0.08 K/UL — SIGNIFICANT CHANGE UP (ref 0–0.9)
MONOCYTES NFR BLD AUTO: 3 % — SIGNIFICANT CHANGE UP (ref 2–14)
NEUTROPHILS # BLD AUTO: 1.35 K/UL — LOW (ref 1.8–7.4)
NEUTROPHILS NFR BLD AUTO: 51 % — SIGNIFICANT CHANGE UP (ref 43–77)
NRBC # BLD: 0 /100 — SIGNIFICANT CHANGE UP (ref 0–0)
NRBC # BLD: SIGNIFICANT CHANGE UP /100 WBCS (ref 0–0)
PLAT MORPH BLD: NORMAL — SIGNIFICANT CHANGE UP
PLATELET # BLD AUTO: 159 K/UL — SIGNIFICANT CHANGE UP (ref 150–400)
RBC # BLD: 3.89 M/UL — SIGNIFICANT CHANGE UP (ref 3.8–5.2)
RBC # FLD: 20.8 % — HIGH (ref 10.3–14.5)
RBC BLD AUTO: SIGNIFICANT CHANGE UP
WBC # BLD: 2.65 K/UL — LOW (ref 3.8–10.5)
WBC # FLD AUTO: 2.65 K/UL — LOW (ref 3.8–10.5)

## 2021-02-16 PROCEDURE — 99214 OFFICE O/P EST MOD 30 MIN: CPT

## 2021-02-21 NOTE — REVIEW OF SYSTEMS
[Fatigue] : fatigue [Constipation] : constipation [Joint Pain] : joint pain [Negative] : Allergic/Immunologic [FreeTextEntry7] : nausea

## 2021-02-21 NOTE — HISTORY OF PRESENT ILLNESS
[Disease: _____________________] : Disease: [unfilled] [AJCC Stage: ____] : AJCC Stage: [unfilled] [Treatment Protocol] : Treatment Protocol [de-identified] : 70F hx of asthma, arthritis and dx of Stage II FIGO Grade 1 endometrial ca presenting for initial consultation for recurrent metastatic disease.\par \par Prior onc care and surgery were performed in [place]. Pt was initially diagnosed in 2016 after she presented with PMB. On 1/4/17, she underwent a laparoscopic hysterectomy and bilateral salpingo-oophorectomy (Dr. Combs- [place]); there was no node dissection. There was extensive ASIF due to prior surgery. Pathology: endometrioid adenocarcinoma FIGO grade 1 with villoglandular features. The carcinoma superficially invaded the myometrium. Depth of myometrial invasion was less then 50%. Negative for lymphovascular invasion. Carcinoma extensively involved the endocervical glands with cervical mucosal and stromal invasion. Peritoneal cytology was benign. (Clinical stage II FIGO grade 1). In 4/2017, pt completed vaginal brachytherapy (Dr. Manolo Del Real -[place]).\par Since then, pt has been following with Dr. Russ from Gyn/Onc.\par She underwent a PET /CT 8/31/20, which showed increase in size and number of lung nodules, measuring up to 1 cm in RUL, 0.9cm in JORGE LUIS; suspicious for metastatic disease. There was also asymmetric FDG avidity of the left base of tongue/vallecula for which pt will obtain an ENT eval.\par On 9/17/20, pt underwent R Vats with wedge resection x 3 per CT surgery. 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 \par \par  [FreeTextEntry1] :  Carboplatin and Taxol\par C1 - 10/27/20. \par C2 -11/17/20\par C3 - 12/8/20. \par C4- 12/29/2020\par C5 - 1/19/21\par C6 - 2/9/21 [de-identified] : Patient completed six cycles.\par She has fatigue, no neuropathy.\par Appetite is fair.\par She takes bowel regimen for bowel irregularities.

## 2021-02-22 LAB
ALBUMIN SERPL ELPH-MCNC: 4.4 G/DL
ALP BLD-CCNC: 100 U/L
ALT SERPL-CCNC: 16 U/L
ANION GAP SERPL CALC-SCNC: 14 MMOL/L
AST SERPL-CCNC: 26 U/L
BILIRUB SERPL-MCNC: 0.2 MG/DL
BUN SERPL-MCNC: 14 MG/DL
CALCIUM SERPL-MCNC: 9.7 MG/DL
CANCER AG125 SERPL-ACNC: 19 U/ML
CEA SERPL-MCNC: 1.9 NG/ML
CHLORIDE SERPL-SCNC: 100 MMOL/L
CO2 SERPL-SCNC: 24 MMOL/L
CREAT SERPL-MCNC: 0.74 MG/DL
GLUCOSE SERPL-MCNC: 127 MG/DL
MAGNESIUM SERPL-MCNC: 1.2 MG/DL
POTASSIUM SERPL-SCNC: 4.8 MMOL/L
PROT SERPL-MCNC: 6.7 G/DL
SODIUM SERPL-SCNC: 138 MMOL/L

## 2021-03-02 ENCOUNTER — APPOINTMENT (OUTPATIENT)
Dept: GYNECOLOGIC ONCOLOGY | Facility: CLINIC | Age: 71
End: 2021-03-02
Payer: MEDICARE

## 2021-03-02 DIAGNOSIS — C78.00 MALIGNANT NEOPLASM OF ENDOMETRIUM: ICD-10-CM

## 2021-03-02 DIAGNOSIS — C54.1 MALIGNANT NEOPLASM OF ENDOMETRIUM: ICD-10-CM

## 2021-03-02 PROCEDURE — 99213 OFFICE O/P EST LOW 20 MIN: CPT

## 2021-03-02 RX ORDER — METOCLOPRAMIDE 10 MG/1
10 TABLET ORAL EVERY 6 HOURS
Qty: 30 | Refills: 2 | Status: DISCONTINUED | COMMUNITY
Start: 2021-01-26 | End: 2021-03-02

## 2021-03-02 RX ORDER — APREPITANT 80 MG/1
80 CAPSULE ORAL
Qty: 2 | Refills: 5 | Status: DISCONTINUED | COMMUNITY
Start: 2020-10-16 | End: 2021-03-02

## 2021-03-02 RX ORDER — DEXAMETHASONE 4 MG/1
4 TABLET ORAL
Qty: 30 | Refills: 1 | Status: DISCONTINUED | COMMUNITY
Start: 2020-10-16 | End: 2021-03-02

## 2021-03-02 NOTE — HISTORY OF PRESENT ILLNESS
[FreeTextEntry1] : Ms. Chang has a h/o stage II endometrial cancer and returns today for followup. Surgery was performed in [place] and she transferred her care postoperatively when she moved to [place].\par \par History:\par In 2016 she presented with PMB. \par 12/21/2016 endometrial biopsy- grade 1 adenocarcinoma of the endometrium with focal villoglandular differentiation. Cervix biopsy- adenocarcinoma involving the surface squamoglandular mucosa. \par Comment- specimen does not show any definitive cervical stromal involvement by adenocarcinoma. \par \par 1/4/2017- laparoscopic hysterectomy and bilateral salpingo-oophorectomy (Dr. Combs- [place]); there was no node dissection. There was extensive ASIF due to prior surgery.\par Pathology: endometrioid adenocarcinoma FIGO grade 1 with villoglandular features. The carcinoma superficially invaded the myometrium. Depth of myometrial invasion was less then 50%. Negative for lymphovascular invasion. Carcinoma extensively involved the endocervical glands but no definite stromal invasion was identified. Peritoneal cytology was benign. (clinical stage IA FIGO grade 1); \par [facility] PATH REVIEW indicated cervical stromal invasion, c/w STAGE II disease.\par \par 1/27/17 CT C/A/P (postop): no pulm nodules noted in report. no adenopathy. stable hepatic masses c/w hemangiomata, left renal angiomyolipoma.\par 4/2017- completed vaginal brachytherapy (Dr. Manolo Del Real -[place]); vaginal dilator prescribed - she uses it 1x/week.\par \par 3/9/2018- She saw Dr. Zaragoza as a new patient. A pelvic US was ordered. Uterus absent. Vaginal cuff appeared unremarkable. \par \par 5/8/18 Initial consultation here.\par 7/5/18 CT A/P w/contrast for extent of disease workup: Indeterminate nodular hypodensities in liver up to 1.5cm; 3mm pancreatic tail hypodensity, left renal angiomyolipoma, no adenopathy or evidence recurrent disease. \par 9/26/18 MRI lumbar spine - [facility]\par CT A/P 10/20/18: unchanged hepatic/pancreatic findings, unchanged renal findings; no ascites, no adenopathy. ?New small bibasilar lung nodules since 7/5/18\par CT chest 11/12/18: bilateral few <4mm lung nodules are unchanged from October scan; no comment re: whether these are new from July scan; patient was seen by pulm and no followup is recommended. \par MRI A/P w/ and w/o contrast 2/24/19 (ordered due to pancreatic cyst and 20#weight loss): pancreas normal, previously-seen 3mm lesion is poorly visualized; no adenopathy; scan is KAILASH. \par (She was planning to have an endoscopic US for further evaluation of pancreatic findings (positive family history), but this was not felt to be necessary after review of prior images.  was normal 2/2019. \par CT chest 5/23/19: minimal increase in size of 3 pulm nodules - saw Dr. Ramirez then Dr. Aiken; plan f/u non-contrast chest CT in 6 months.\par 7/24/19 - CT A/P w/PO and IV contrast- without any evidence of abdominal or pelvic recurrence. Lung nodules at bases stable from May scan.\par \par 9/3/20 PET/CT - increase in size and number of lung nodules, measuring up to 1 cm in RUL, 0.9cm in JORGE LUIS; suspicious for metastatic disease. Indeterminate base of tongue findings. Addended PET/Ct report - base of tongue finding was present in 2017 but now more conspicuous; she saw her dentist and was referred to OMFS\par \par 9/17/20: She underwent R VATS and 3 wedge resections ; path: metastatic adenocarcinoma c/w endometrial primary, +CK7 and PAX8; strongly ER/SC (+)\par \par 12/21/20: CT C/A/P: no significant change in pulmonary nodules compared to 8/2020. \par \par Current Treatment Status: Treatment Protocol . Carboplatin and Taxol\par C1 - 10/27/20. \par C2 -11/17/20\par C3 - 12/8/20. 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 allergy. She has been advised not to have IV contrast again.\par \par She has nausea since completing chemo. She has occasional abdominal pain; otherwise feels very well and denies any VB or other associated signs or symptoms. \par \par HM:\par Pap- 1/26/2018- negative, HPV not performed (preop) - now n/a\par Mammogram- 8/2020 BIRADS1\par Colonoscopy- 6/2018; 5 benign polyps removed \par \par Referred by- Dr. Zaragoza\par PCP- Dr. Juan\par GI - Dr. Alda Moraes; Dr. Wale Hernandez (pancreas)\par Pulmonary- Dr. Ramirez\par Radiation oncologist: Dr. Manolo Del Real

## 2021-03-02 NOTE — PHYSICAL EXAM
[Absent] : Adnexa(ae): Absent [Normal] : Recto-Vaginal Exam: Normal [de-identified] : well-healed [de-identified] : cuff intact, no lesions [de-identified] : no mass or tenderness

## 2021-03-02 NOTE — LETTER BODY
[Dear  ___] : Dear  [unfilled], [I recently saw our patient [unfilled] for a follow-up visit.] : I recently saw our patient, [unfilled] for a follow-up visit. [Attached please find my note.] : Attached please find my note. [FreeTextEntry2] : She is receiving treatment for recurrent disease. She will see you annually for gynecologic health maintenance. Thank you again for referring this bharat patient.

## 2021-03-04 ENCOUNTER — APPOINTMENT (OUTPATIENT)
Dept: NUCLEAR MEDICINE | Facility: CLINIC | Age: 71
End: 2021-03-04
Payer: MEDICARE

## 2021-03-04 ENCOUNTER — RESULT REVIEW (OUTPATIENT)
Age: 71
End: 2021-03-04

## 2021-03-04 ENCOUNTER — OUTPATIENT (OUTPATIENT)
Dept: OUTPATIENT SERVICES | Facility: HOSPITAL | Age: 71
LOS: 1 days | End: 2021-03-04
Payer: MEDICARE

## 2021-03-04 DIAGNOSIS — Z98.890 OTHER SPECIFIED POSTPROCEDURAL STATES: Chronic | ICD-10-CM

## 2021-03-04 DIAGNOSIS — M20.40 OTHER HAMMER TOE(S) (ACQUIRED), UNSPECIFIED FOOT: Chronic | ICD-10-CM

## 2021-03-04 DIAGNOSIS — N84.0 POLYP OF CORPUS UTERI: Chronic | ICD-10-CM

## 2021-03-04 DIAGNOSIS — Z90.710 ACQUIRED ABSENCE OF BOTH CERVIX AND UTERUS: Chronic | ICD-10-CM

## 2021-03-04 DIAGNOSIS — Z90.49 ACQUIRED ABSENCE OF OTHER SPECIFIED PARTS OF DIGESTIVE TRACT: Chronic | ICD-10-CM

## 2021-03-04 DIAGNOSIS — Z96.659 PRESENCE OF UNSPECIFIED ARTIFICIAL KNEE JOINT: Chronic | ICD-10-CM

## 2021-03-04 DIAGNOSIS — Z90.89 ACQUIRED ABSENCE OF OTHER ORGANS: Chronic | ICD-10-CM

## 2021-03-04 DIAGNOSIS — C54.1 MALIGNANT NEOPLASM OF ENDOMETRIUM: ICD-10-CM

## 2021-03-04 DIAGNOSIS — D22.9 MELANOCYTIC NEVI, UNSPECIFIED: Chronic | ICD-10-CM

## 2021-03-04 PROCEDURE — G1004: CPT

## 2021-03-04 PROCEDURE — 78815 PET IMAGE W/CT SKULL-THIGH: CPT | Mod: 26,PS,MG

## 2021-03-04 PROCEDURE — 78815 PET IMAGE W/CT SKULL-THIGH: CPT

## 2021-03-04 PROCEDURE — A9552: CPT

## 2021-03-13 ENCOUNTER — OUTPATIENT (OUTPATIENT)
Dept: OUTPATIENT SERVICES | Facility: HOSPITAL | Age: 71
LOS: 1 days | Discharge: HOME | End: 2021-03-13

## 2021-03-13 DIAGNOSIS — C54.1 MALIGNANT NEOPLASM OF ENDOMETRIUM: ICD-10-CM

## 2021-03-13 DIAGNOSIS — Z90.49 ACQUIRED ABSENCE OF OTHER SPECIFIED PARTS OF DIGESTIVE TRACT: Chronic | ICD-10-CM

## 2021-03-13 DIAGNOSIS — Z96.659 PRESENCE OF UNSPECIFIED ARTIFICIAL KNEE JOINT: Chronic | ICD-10-CM

## 2021-03-13 DIAGNOSIS — Z98.890 OTHER SPECIFIED POSTPROCEDURAL STATES: Chronic | ICD-10-CM

## 2021-03-13 DIAGNOSIS — N84.0 POLYP OF CORPUS UTERI: Chronic | ICD-10-CM

## 2021-03-13 DIAGNOSIS — M20.40 OTHER HAMMER TOE(S) (ACQUIRED), UNSPECIFIED FOOT: Chronic | ICD-10-CM

## 2021-03-13 DIAGNOSIS — Z90.89 ACQUIRED ABSENCE OF OTHER ORGANS: Chronic | ICD-10-CM

## 2021-03-13 DIAGNOSIS — Z90.710 ACQUIRED ABSENCE OF BOTH CERVIX AND UTERUS: Chronic | ICD-10-CM

## 2021-03-13 DIAGNOSIS — D22.9 MELANOCYTIC NEVI, UNSPECIFIED: Chronic | ICD-10-CM

## 2021-03-17 ENCOUNTER — RESULT REVIEW (OUTPATIENT)
Age: 71
End: 2021-03-17

## 2021-03-17 ENCOUNTER — APPOINTMENT (OUTPATIENT)
Dept: HEMATOLOGY ONCOLOGY | Facility: CLINIC | Age: 71
End: 2021-03-17
Payer: MEDICARE

## 2021-03-17 VITALS
SYSTOLIC BLOOD PRESSURE: 153 MMHG | WEIGHT: 168 LBS | TEMPERATURE: 97.3 F | BODY MASS INDEX: 31.72 KG/M2 | HEIGHT: 61 IN | HEART RATE: 89 BPM | DIASTOLIC BLOOD PRESSURE: 90 MMHG | RESPIRATION RATE: 16 BRPM | OXYGEN SATURATION: 99 %

## 2021-03-17 LAB
BASOPHILS # BLD AUTO: 0.03 K/UL — SIGNIFICANT CHANGE UP (ref 0–0.2)
BASOPHILS NFR BLD AUTO: 0.5 % — SIGNIFICANT CHANGE UP (ref 0–2)
EOSINOPHIL # BLD AUTO: 0.01 K/UL — SIGNIFICANT CHANGE UP (ref 0–0.5)
EOSINOPHIL NFR BLD AUTO: 0.2 % — SIGNIFICANT CHANGE UP (ref 0–6)
HCT VFR BLD CALC: 31.7 % — LOW (ref 34.5–45)
HGB BLD-MCNC: 10 G/DL — LOW (ref 11.5–15.5)
IMM GRANULOCYTES NFR BLD AUTO: 0.3 % — SIGNIFICANT CHANGE UP (ref 0–1.5)
LYMPHOCYTES # BLD AUTO: 1.26 K/UL — SIGNIFICANT CHANGE UP (ref 1–3.3)
LYMPHOCYTES # BLD AUTO: 20.7 % — SIGNIFICANT CHANGE UP (ref 13–44)
MCHC RBC-ENTMCNC: 22.6 PG — LOW (ref 27–34)
MCHC RBC-ENTMCNC: 31.5 G/DL — LOW (ref 32–36)
MCV RBC AUTO: 71.6 FL — LOW (ref 80–100)
MONOCYTES # BLD AUTO: 0.43 K/UL — SIGNIFICANT CHANGE UP (ref 0–0.9)
MONOCYTES NFR BLD AUTO: 7.1 % — SIGNIFICANT CHANGE UP (ref 2–14)
NEUTROPHILS # BLD AUTO: 4.34 K/UL — SIGNIFICANT CHANGE UP (ref 1.8–7.4)
NEUTROPHILS NFR BLD AUTO: 71.2 % — SIGNIFICANT CHANGE UP (ref 43–77)
NRBC # BLD: 0 /100 WBCS — SIGNIFICANT CHANGE UP (ref 0–0)
PLATELET # BLD AUTO: 263 K/UL — SIGNIFICANT CHANGE UP (ref 150–400)
RBC # BLD: 4.43 M/UL — SIGNIFICANT CHANGE UP (ref 3.8–5.2)
RBC # FLD: 19.9 % — HIGH (ref 10.3–14.5)
WBC # BLD: 6.09 K/UL — SIGNIFICANT CHANGE UP (ref 3.8–10.5)
WBC # FLD AUTO: 6.09 K/UL — SIGNIFICANT CHANGE UP (ref 3.8–10.5)

## 2021-03-17 PROCEDURE — 99214 OFFICE O/P EST MOD 30 MIN: CPT

## 2021-03-18 LAB
24R-OH-CALCIDIOL SERPL-MCNC: 72.6 PG/ML
ALBUMIN SERPL ELPH-MCNC: 4.5 G/DL
ALP BLD-CCNC: 97 U/L
ALT SERPL-CCNC: 15 U/L
ANION GAP SERPL CALC-SCNC: 12 MMOL/L
AST SERPL-CCNC: 26 U/L
BILIRUB SERPL-MCNC: 0.4 MG/DL
BUN SERPL-MCNC: 12 MG/DL
CALCIUM SERPL-MCNC: 9.8 MG/DL
CANCER AG125 SERPL-ACNC: 18 U/ML
CEA SERPL-MCNC: 1.8 NG/ML
CHLORIDE SERPL-SCNC: 103 MMOL/L
CO2 SERPL-SCNC: 27 MMOL/L
CREAT SERPL-MCNC: 0.69 MG/DL
ESTIMATED AVERAGE GLUCOSE: 94 MG/DL
GLUCOSE SERPL-MCNC: 107 MG/DL
HBA1C MFR BLD HPLC: 4.9 %
MAGNESIUM SERPL-MCNC: 1.9 MG/DL
POTASSIUM SERPL-SCNC: 4.8 MMOL/L
PROT SERPL-MCNC: 6.6 G/DL
SODIUM SERPL-SCNC: 142 MMOL/L

## 2021-03-18 NOTE — HISTORY OF PRESENT ILLNESS
[Disease: _____________________] : Disease: [unfilled] [AJCC Stage: ____] : AJCC Stage: [unfilled] [de-identified] : 70F hx of asthma, arthritis and dx of Stage II FIGO Grade 1 endometrial ca presenting for initial consultation for recurrent metastatic disease.\par \par Prior onc care and surgery were performed in [place]. Pt was initially diagnosed in 2016 after she presented with PMB. On 1/4/17, she underwent a laparoscopic hysterectomy and bilateral salpingo-oophorectomy (Dr. Combs- [place]); there was no node dissection. There was extensive ASIF due to prior surgery. Pathology: endometrioid adenocarcinoma FIGO grade 1 with villoglandular features. The carcinoma superficially invaded the myometrium. Depth of myometrial invasion was less then 50%. Negative for lymphovascular invasion. Carcinoma extensively involved the endocervical glands with cervical mucosal and stromal invasion. Peritoneal cytology was benign. (Clinical stage II FIGO grade 1). In 4/2017, pt completed vaginal brachytherapy (Dr. Manolo Del Real -[place]).\par Since then, pt has been following with Dr. Russ from Gyn/Onc.\par She underwent a PET /CT 8/31/20, which showed increase in size and number of lung nodules, measuring up to 1 cm in RUL, 0.9cm in JORGE LUIS; suspicious for metastatic disease. There was also asymmetric FDG avidity of the left base of tongue/vallecula for which pt will obtain an ENT eval.\par On 9/17/20, pt underwent R Vats with wedge resection x 3 per CT surgery. 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 \par \par Patient completed 6 cycles of Carboplatin and Taxol (10/27/20 - 2/9/21).\par Follow up PET scan showed very good response to chemotherapy.\par  [de-identified] : Patient is here for follow up after completing 6 cycles of chemo.  She had follow up PET scan which showed good response to treatment.  Her appetite continues to be decreased.  She continues to have fatigue.  Some intermittent nausea but seems to be resolving.  She complains of cold sensation of hands since 5th cycle of chemo, denies numbness/tingling or having trouble using her hands.  Denies SOB, cough, abdominal pain, vomiting, diarrhea, constipation.

## 2021-03-22 ENCOUNTER — APPOINTMENT (OUTPATIENT)
Dept: OTOLARYNGOLOGY | Facility: CLINIC | Age: 71
End: 2021-03-22
Payer: MEDICARE

## 2021-03-22 VITALS
HEIGHT: 61 IN | OXYGEN SATURATION: 98 % | TEMPERATURE: 97.3 F | BODY MASS INDEX: 31.72 KG/M2 | SYSTOLIC BLOOD PRESSURE: 163 MMHG | DIASTOLIC BLOOD PRESSURE: 90 MMHG | WEIGHT: 168 LBS | HEART RATE: 90 BPM

## 2021-03-22 PROCEDURE — 31575 DIAGNOSTIC LARYNGOSCOPY: CPT

## 2021-03-22 PROCEDURE — 99214 OFFICE O/P EST MOD 30 MIN: CPT | Mod: 25

## 2021-03-22 NOTE — PROCEDURE
[None] : none [Flexible Endoscope] : examined with the flexible endoscope [Hoarseness] : hoarseness not clearly evaluated by indirect laryngoscopy [Serial Number: ___] : Serial Number: [unfilled] [de-identified] : No lesions in the NPx, OPx, HPx or larynx. VC are mobile, airway patent.  She has a small anterior glottic gap on phonation.  \par

## 2021-03-22 NOTE — CONSULT LETTER
[Dear  ___] : Dear  [unfilled], [Courtesy Letter:] : I had the pleasure of seeing your patient, [unfilled], in my office today. [Please see my note below.] : Please see my note below. [Consult Closing:] : Thank you very much for allowing me to participate in the care of this patient.  If you have any questions, please do not hesitate to contact me. [Sincerely,] : Sincerely, [FreeTextEntry2] : Dr. Shaun Hammer\par [address], [address]\[place] [address] [FreeTextEntry3] : Dev

## 2021-03-22 NOTE — HISTORY OF PRESENT ILLNESS
[de-identified] : pt with vocal cord polyp ( removed 1995 and 2014)asthma, arthritis and dx of Stage II FIGO Grade 1 endometrial ca and  refer by Dr. Hammer for abnormal finding on PET CT.  pet ct showed 2.  Indeterminate asymmetric FDG avidity left base of tongue/vallecula. Suggest correlation with direct inspection. pt Dr. Agosto did scope and per pt did not find anything. pt has no c.o at the throat or neck. pt also oral surgeon and did not find anything. pt completed chemo for endometrial cancer about 3/9/2021 and doing well. pt is here to f/u and still has no throat or neck c.o. last PET ct on 3/4/2021.  She reports intermittent dysphonia which has been present since she underwent thyroid surgery in [place] about 5 years ago.

## 2021-03-30 ENCOUNTER — TRANSCRIPTION ENCOUNTER (OUTPATIENT)
Age: 71
End: 2021-03-30

## 2021-04-12 ENCOUNTER — APPOINTMENT (OUTPATIENT)
Dept: RADIOLOGY | Facility: CLINIC | Age: 71
End: 2021-04-12
Payer: MEDICARE

## 2021-04-12 ENCOUNTER — OUTPATIENT (OUTPATIENT)
Dept: OUTPATIENT SERVICES | Facility: HOSPITAL | Age: 71
LOS: 1 days | End: 2021-04-12
Payer: MEDICARE

## 2021-04-12 DIAGNOSIS — Z90.49 ACQUIRED ABSENCE OF OTHER SPECIFIED PARTS OF DIGESTIVE TRACT: Chronic | ICD-10-CM

## 2021-04-12 DIAGNOSIS — N84.0 POLYP OF CORPUS UTERI: Chronic | ICD-10-CM

## 2021-04-12 DIAGNOSIS — Z98.890 OTHER SPECIFIED POSTPROCEDURAL STATES: Chronic | ICD-10-CM

## 2021-04-12 DIAGNOSIS — C55 MALIGNANT NEOPLASM OF UTERUS, PART UNSPECIFIED: ICD-10-CM

## 2021-04-12 DIAGNOSIS — M20.40 OTHER HAMMER TOE(S) (ACQUIRED), UNSPECIFIED FOOT: Chronic | ICD-10-CM

## 2021-04-12 DIAGNOSIS — Z96.659 PRESENCE OF UNSPECIFIED ARTIFICIAL KNEE JOINT: Chronic | ICD-10-CM

## 2021-04-12 DIAGNOSIS — Z90.89 ACQUIRED ABSENCE OF OTHER ORGANS: Chronic | ICD-10-CM

## 2021-04-12 DIAGNOSIS — Z90.710 ACQUIRED ABSENCE OF BOTH CERVIX AND UTERUS: Chronic | ICD-10-CM

## 2021-04-12 DIAGNOSIS — D22.9 MELANOCYTIC NEVI, UNSPECIFIED: Chronic | ICD-10-CM

## 2021-04-12 PROCEDURE — 77085 DXA BONE DENSITY AXL VRT FX: CPT | Mod: 26

## 2021-04-12 PROCEDURE — 77085 DXA BONE DENSITY AXL VRT FX: CPT

## 2021-05-06 ENCOUNTER — APPOINTMENT (OUTPATIENT)
Dept: INTERNAL MEDICINE | Facility: CLINIC | Age: 71
End: 2021-05-06
Payer: MEDICARE

## 2021-05-06 VITALS
SYSTOLIC BLOOD PRESSURE: 152 MMHG | DIASTOLIC BLOOD PRESSURE: 84 MMHG | RESPIRATION RATE: 14 BRPM | HEART RATE: 82 BPM | TEMPERATURE: 97.9 F | OXYGEN SATURATION: 98 % | WEIGHT: 164 LBS | BODY MASS INDEX: 30.96 KG/M2 | HEIGHT: 61 IN

## 2021-05-06 VITALS — SYSTOLIC BLOOD PRESSURE: 132 MMHG | DIASTOLIC BLOOD PRESSURE: 82 MMHG

## 2021-05-06 PROCEDURE — 99213 OFFICE O/P EST LOW 20 MIN: CPT

## 2021-05-06 NOTE — HISTORY OF PRESENT ILLNESS
[de-identified] : Pt here today for follow up.\par Pt with Diag of Stage  11 endometrial ca- followed by GYN Onc, and hem Onc\par Pt followed by pulmonary.\par Complains of fatigue

## 2021-05-06 NOTE — ASSESSMENT
[FreeTextEntry1] : Endometrial CA- doing well. Follow up with hem Onc and GYN Onc\par RAD- follow up with pulmonary only using Ventolin PRN.

## 2021-05-07 LAB
COVID-19 SPIKE DOMAIN ANTIBODY INTERPRETATION: POSITIVE
SARS-COV-2 AB SERPL IA-ACNC: >250 U/ML

## 2021-05-18 ENCOUNTER — TRANSCRIPTION ENCOUNTER (OUTPATIENT)
Age: 71
End: 2021-05-18

## 2021-05-19 RX ORDER — ONDANSETRON 4 MG/1
4 TABLET, ORALLY DISINTEGRATING ORAL
Qty: 30 | Refills: 0 | Status: ACTIVE | COMMUNITY
Start: 2021-01-19 | End: 1900-01-01

## 2021-05-28 ENCOUNTER — OUTPATIENT (OUTPATIENT)
Dept: OUTPATIENT SERVICES | Facility: HOSPITAL | Age: 71
LOS: 1 days | Discharge: HOME | End: 2021-05-28

## 2021-05-28 DIAGNOSIS — D22.9 MELANOCYTIC NEVI, UNSPECIFIED: Chronic | ICD-10-CM

## 2021-05-28 DIAGNOSIS — Z98.890 OTHER SPECIFIED POSTPROCEDURAL STATES: Chronic | ICD-10-CM

## 2021-05-28 DIAGNOSIS — M20.40 OTHER HAMMER TOE(S) (ACQUIRED), UNSPECIFIED FOOT: Chronic | ICD-10-CM

## 2021-05-28 DIAGNOSIS — Z90.89 ACQUIRED ABSENCE OF OTHER ORGANS: Chronic | ICD-10-CM

## 2021-05-28 DIAGNOSIS — Z90.710 ACQUIRED ABSENCE OF BOTH CERVIX AND UTERUS: Chronic | ICD-10-CM

## 2021-05-28 DIAGNOSIS — N84.0 POLYP OF CORPUS UTERI: Chronic | ICD-10-CM

## 2021-05-28 DIAGNOSIS — Z96.659 PRESENCE OF UNSPECIFIED ARTIFICIAL KNEE JOINT: Chronic | ICD-10-CM

## 2021-05-28 DIAGNOSIS — Z90.49 ACQUIRED ABSENCE OF OTHER SPECIFIED PARTS OF DIGESTIVE TRACT: Chronic | ICD-10-CM

## 2021-05-28 DIAGNOSIS — C54.1 MALIGNANT NEOPLASM OF ENDOMETRIUM: ICD-10-CM

## 2021-06-02 ENCOUNTER — RESULT REVIEW (OUTPATIENT)
Age: 71
End: 2021-06-02

## 2021-06-02 ENCOUNTER — APPOINTMENT (OUTPATIENT)
Dept: HEMATOLOGY ONCOLOGY | Facility: CLINIC | Age: 71
End: 2021-06-02
Payer: MEDICARE

## 2021-06-02 VITALS
HEART RATE: 98 BPM | RESPIRATION RATE: 16 BRPM | WEIGHT: 169.95 LBS | BODY MASS INDEX: 32.09 KG/M2 | SYSTOLIC BLOOD PRESSURE: 162 MMHG | OXYGEN SATURATION: 98 % | HEIGHT: 61 IN | TEMPERATURE: 97 F | DIASTOLIC BLOOD PRESSURE: 90 MMHG

## 2021-06-02 LAB
BASOPHILS # BLD AUTO: 0.04 K/UL — SIGNIFICANT CHANGE UP (ref 0–0.2)
BASOPHILS NFR BLD AUTO: 0.5 % — SIGNIFICANT CHANGE UP (ref 0–2)
EOSINOPHIL # BLD AUTO: 0.01 K/UL — SIGNIFICANT CHANGE UP (ref 0–0.5)
EOSINOPHIL NFR BLD AUTO: 0.1 % — SIGNIFICANT CHANGE UP (ref 0–6)
HCT VFR BLD CALC: 35.5 % — SIGNIFICANT CHANGE UP (ref 34.5–45)
HGB BLD-MCNC: 11 G/DL — LOW (ref 11.5–15.5)
IMM GRANULOCYTES NFR BLD AUTO: 0.6 % — SIGNIFICANT CHANGE UP (ref 0–1.5)
LYMPHOCYTES # BLD AUTO: 1.54 K/UL — SIGNIFICANT CHANGE UP (ref 1–3.3)
LYMPHOCYTES # BLD AUTO: 18.2 % — SIGNIFICANT CHANGE UP (ref 13–44)
MCHC RBC-ENTMCNC: 20.2 PG — LOW (ref 27–34)
MCHC RBC-ENTMCNC: 31 G/DL — LOW (ref 32–36)
MCV RBC AUTO: 65.1 FL — LOW (ref 80–100)
MONOCYTES # BLD AUTO: 0.55 K/UL — SIGNIFICANT CHANGE UP (ref 0–0.9)
MONOCYTES NFR BLD AUTO: 6.5 % — SIGNIFICANT CHANGE UP (ref 2–14)
NEUTROPHILS # BLD AUTO: 6.26 K/UL — SIGNIFICANT CHANGE UP (ref 1.8–7.4)
NEUTROPHILS NFR BLD AUTO: 74.1 % — SIGNIFICANT CHANGE UP (ref 43–77)
NRBC # BLD: 0 /100 WBCS — SIGNIFICANT CHANGE UP (ref 0–0)
PLATELET # BLD AUTO: 211 K/UL — SIGNIFICANT CHANGE UP (ref 150–400)
RBC # BLD: 5.45 M/UL — HIGH (ref 3.8–5.2)
RBC # FLD: 16.5 % — HIGH (ref 10.3–14.5)
WBC # BLD: 8.45 K/UL — SIGNIFICANT CHANGE UP (ref 3.8–10.5)
WBC # FLD AUTO: 8.45 K/UL — SIGNIFICANT CHANGE UP (ref 3.8–10.5)

## 2021-06-02 PROCEDURE — 99214 OFFICE O/P EST MOD 30 MIN: CPT

## 2021-06-03 LAB
ALBUMIN SERPL ELPH-MCNC: 4.7 G/DL
ALP BLD-CCNC: 112 U/L
ALT SERPL-CCNC: 16 U/L
ANION GAP SERPL CALC-SCNC: 14 MMOL/L
AST SERPL-CCNC: 21 U/L
BILIRUB SERPL-MCNC: 0.3 MG/DL
BUN SERPL-MCNC: 15 MG/DL
CALCIUM SERPL-MCNC: 10.2 MG/DL
CANCER AG125 SERPL-ACNC: 19 U/ML
CEA SERPL-MCNC: 1.8 NG/ML
CHLORIDE SERPL-SCNC: 101 MMOL/L
CO2 SERPL-SCNC: 26 MMOL/L
CREAT SERPL-MCNC: 0.66 MG/DL
GLUCOSE SERPL-MCNC: 97 MG/DL
POTASSIUM SERPL-SCNC: 5 MMOL/L
PROT SERPL-MCNC: 7.2 G/DL
SODIUM SERPL-SCNC: 141 MMOL/L

## 2021-06-03 NOTE — HISTORY OF PRESENT ILLNESS
[Disease: _____________________] : Disease: [unfilled] [AJCC Stage: ____] : AJCC Stage: [unfilled] [de-identified] : 70F hx of asthma, arthritis and dx of Stage II FIGO Grade 1 endometrial ca presenting for initial consultation for recurrent metastatic disease.\par \par Prior onc care and surgery were performed in [place]. Pt was initially diagnosed in 2016 after she presented with PMB. On 1/4/17, she underwent a laparoscopic hysterectomy and bilateral salpingo-oophorectomy (Dr. Combs- [place]); there was no node dissection. There was extensive ASIF due to prior surgery. Pathology: endometrioid adenocarcinoma FIGO grade 1 with villoglandular features. The carcinoma superficially invaded the myometrium. Depth of myometrial invasion was less then 50%. Negative for lymphovascular invasion. Carcinoma extensively involved the endocervical glands with cervical mucosal and stromal invasion. Peritoneal cytology was benign. (Clinical stage II FIGO grade 1). In 4/2017, pt completed vaginal brachytherapy (Dr. Manolo Del Real -[place]).\par Since then, pt has been following with Dr. Russ from Gyn/Onc.\par She underwent a PET /CT 8/31/20, which showed increase in size and number of lung nodules, measuring up to 1 cm in RUL, 0.9cm in JORGE LUIS; suspicious for metastatic disease. There was also asymmetric FDG avidity of the left base of tongue/vallecula for which pt will obtain an ENT eval.\par On 9/17/20, pt underwent R Vats with wedge resection x 3 per CT surgery. 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 \par \par  [de-identified] : Patient c/o nausea , started today. Also c.o some dizziness.No fall.\par Appetite is good.\par She is fully vaccinated.\par Last dose of vaccine was in April.

## 2021-06-04 ENCOUNTER — RESULT REVIEW (OUTPATIENT)
Age: 71
End: 2021-06-04

## 2021-06-12 PROBLEM — C79.9 METASTATIC CANCER: Status: ACTIVE | Noted: 2021-06-12

## 2021-06-15 ENCOUNTER — APPOINTMENT (OUTPATIENT)
Dept: GYNECOLOGIC ONCOLOGY | Facility: CLINIC | Age: 71
End: 2021-06-15
Payer: MEDICARE

## 2021-06-15 VITALS
WEIGHT: 168 LBS | HEIGHT: 61 IN | HEART RATE: 93 BPM | DIASTOLIC BLOOD PRESSURE: 85 MMHG | SYSTOLIC BLOOD PRESSURE: 149 MMHG | BODY MASS INDEX: 31.72 KG/M2

## 2021-06-15 DIAGNOSIS — C79.9 SECONDARY MALIGNANT NEOPLASM OF UNSPECIFIED SITE: ICD-10-CM

## 2021-06-15 PROCEDURE — 99213 OFFICE O/P EST LOW 20 MIN: CPT

## 2021-06-15 RX ORDER — ACETAMINOPHEN 325 MG
TABLET ORAL
Refills: 0 | Status: DISCONTINUED | COMMUNITY
End: 2021-06-15

## 2021-06-15 RX ORDER — CHOLECALCIFEROL (VITAMIN D3) 50 MCG
1000 CAPSULE ORAL
Refills: 0 | Status: DISCONTINUED | COMMUNITY
End: 2021-06-15

## 2021-06-24 ENCOUNTER — OUTPATIENT (OUTPATIENT)
Dept: OUTPATIENT SERVICES | Facility: HOSPITAL | Age: 71
LOS: 1 days | End: 2021-06-24
Payer: MEDICARE

## 2021-06-24 ENCOUNTER — APPOINTMENT (OUTPATIENT)
Dept: CT IMAGING | Facility: CLINIC | Age: 71
End: 2021-06-24
Payer: MEDICARE

## 2021-06-24 DIAGNOSIS — Z90.89 ACQUIRED ABSENCE OF OTHER ORGANS: Chronic | ICD-10-CM

## 2021-06-24 DIAGNOSIS — Z98.890 OTHER SPECIFIED POSTPROCEDURAL STATES: Chronic | ICD-10-CM

## 2021-06-24 DIAGNOSIS — Z90.49 ACQUIRED ABSENCE OF OTHER SPECIFIED PARTS OF DIGESTIVE TRACT: Chronic | ICD-10-CM

## 2021-06-24 DIAGNOSIS — Z90.710 ACQUIRED ABSENCE OF BOTH CERVIX AND UTERUS: Chronic | ICD-10-CM

## 2021-06-24 DIAGNOSIS — N84.0 POLYP OF CORPUS UTERI: Chronic | ICD-10-CM

## 2021-06-24 DIAGNOSIS — D22.9 MELANOCYTIC NEVI, UNSPECIFIED: Chronic | ICD-10-CM

## 2021-06-24 DIAGNOSIS — M20.40 OTHER HAMMER TOE(S) (ACQUIRED), UNSPECIFIED FOOT: Chronic | ICD-10-CM

## 2021-06-24 DIAGNOSIS — Z96.659 PRESENCE OF UNSPECIFIED ARTIFICIAL KNEE JOINT: Chronic | ICD-10-CM

## 2021-06-24 DIAGNOSIS — C55 MALIGNANT NEOPLASM OF UTERUS, PART UNSPECIFIED: ICD-10-CM

## 2021-06-24 PROCEDURE — 71250 CT THORAX DX C-: CPT

## 2021-06-24 PROCEDURE — 74176 CT ABD & PELVIS W/O CONTRAST: CPT | Mod: 26,MH

## 2021-06-24 PROCEDURE — 71250 CT THORAX DX C-: CPT | Mod: 26,MH

## 2021-06-24 PROCEDURE — 74176 CT ABD & PELVIS W/O CONTRAST: CPT

## 2021-08-15 ENCOUNTER — RX RENEWAL (OUTPATIENT)
Age: 71
End: 2021-08-15

## 2021-08-31 ENCOUNTER — TRANSCRIPTION ENCOUNTER (OUTPATIENT)
Age: 71
End: 2021-08-31

## 2021-09-06 ENCOUNTER — OUTPATIENT (OUTPATIENT)
Dept: OUTPATIENT SERVICES | Facility: HOSPITAL | Age: 71
LOS: 1 days | Discharge: HOME | End: 2021-09-06

## 2021-09-06 DIAGNOSIS — Z98.890 OTHER SPECIFIED POSTPROCEDURAL STATES: Chronic | ICD-10-CM

## 2021-09-06 DIAGNOSIS — Z90.49 ACQUIRED ABSENCE OF OTHER SPECIFIED PARTS OF DIGESTIVE TRACT: Chronic | ICD-10-CM

## 2021-09-06 DIAGNOSIS — Z96.659 PRESENCE OF UNSPECIFIED ARTIFICIAL KNEE JOINT: Chronic | ICD-10-CM

## 2021-09-06 DIAGNOSIS — Z90.710 ACQUIRED ABSENCE OF BOTH CERVIX AND UTERUS: Chronic | ICD-10-CM

## 2021-09-06 DIAGNOSIS — N84.0 POLYP OF CORPUS UTERI: Chronic | ICD-10-CM

## 2021-09-06 DIAGNOSIS — M20.40 OTHER HAMMER TOE(S) (ACQUIRED), UNSPECIFIED FOOT: Chronic | ICD-10-CM

## 2021-09-06 DIAGNOSIS — Z90.89 ACQUIRED ABSENCE OF OTHER ORGANS: Chronic | ICD-10-CM

## 2021-09-06 DIAGNOSIS — C54.1 MALIGNANT NEOPLASM OF ENDOMETRIUM: ICD-10-CM

## 2021-09-06 DIAGNOSIS — D22.9 MELANOCYTIC NEVI, UNSPECIFIED: Chronic | ICD-10-CM

## 2021-09-08 ENCOUNTER — RESULT REVIEW (OUTPATIENT)
Age: 71
End: 2021-09-08

## 2021-09-08 ENCOUNTER — APPOINTMENT (OUTPATIENT)
Dept: HEMATOLOGY ONCOLOGY | Facility: CLINIC | Age: 71
End: 2021-09-08
Payer: MEDICARE

## 2021-09-08 VITALS
DIASTOLIC BLOOD PRESSURE: 89 MMHG | HEIGHT: 61.02 IN | WEIGHT: 172.4 LBS | HEART RATE: 103 BPM | BODY MASS INDEX: 32.55 KG/M2 | TEMPERATURE: 97.2 F | RESPIRATION RATE: 16 BRPM | SYSTOLIC BLOOD PRESSURE: 156 MMHG | OXYGEN SATURATION: 99 %

## 2021-09-08 LAB
ALBUMIN SERPL ELPH-MCNC: 4.4 G/DL
ALP BLD-CCNC: 113 U/L
ALT SERPL-CCNC: 20 U/L
ANION GAP SERPL CALC-SCNC: 14 MMOL/L
AST SERPL-CCNC: 25 U/L
BASOPHILS # BLD AUTO: 0.08 K/UL — SIGNIFICANT CHANGE UP (ref 0–0.2)
BASOPHILS NFR BLD AUTO: 0.9 % — SIGNIFICANT CHANGE UP (ref 0–2)
BILIRUB SERPL-MCNC: 0.4 MG/DL
BUN SERPL-MCNC: 17 MG/DL
CALCIUM SERPL-MCNC: 10 MG/DL
CANCER AG125 SERPL-ACNC: 19 U/ML
CEA SERPL-MCNC: 1.9 NG/ML
CHLORIDE SERPL-SCNC: 100 MMOL/L
CO2 SERPL-SCNC: 25 MMOL/L
CREAT SERPL-MCNC: 0.72 MG/DL
EOSINOPHIL # BLD AUTO: 0.1 K/UL — SIGNIFICANT CHANGE UP (ref 0–0.5)
EOSINOPHIL NFR BLD AUTO: 1.2 % — SIGNIFICANT CHANGE UP (ref 0–6)
GLUCOSE SERPL-MCNC: 93 MG/DL
HCT VFR BLD CALC: 35.7 % — SIGNIFICANT CHANGE UP (ref 34.5–45)
HGB BLD-MCNC: 10.9 G/DL — LOW (ref 11.5–15.5)
IMM GRANULOCYTES NFR BLD AUTO: 0.4 % — SIGNIFICANT CHANGE UP (ref 0–1.5)
LYMPHOCYTES # BLD AUTO: 2.01 K/UL — SIGNIFICANT CHANGE UP (ref 1–3.3)
LYMPHOCYTES # BLD AUTO: 23.5 % — SIGNIFICANT CHANGE UP (ref 13–44)
MCHC RBC-ENTMCNC: 19.3 PG — LOW (ref 27–34)
MCHC RBC-ENTMCNC: 30.5 G/DL — LOW (ref 32–36)
MCV RBC AUTO: 63.3 FL — LOW (ref 80–100)
MONOCYTES # BLD AUTO: 0.6 K/UL — SIGNIFICANT CHANGE UP (ref 0–0.9)
MONOCYTES NFR BLD AUTO: 7 % — SIGNIFICANT CHANGE UP (ref 2–14)
NEUTROPHILS # BLD AUTO: 5.74 K/UL — SIGNIFICANT CHANGE UP (ref 1.8–7.4)
NEUTROPHILS NFR BLD AUTO: 67 % — SIGNIFICANT CHANGE UP (ref 43–77)
NRBC # BLD: 0 /100 WBCS — SIGNIFICANT CHANGE UP (ref 0–0)
PLATELET # BLD AUTO: 251 K/UL — SIGNIFICANT CHANGE UP (ref 150–400)
POTASSIUM SERPL-SCNC: 4.2 MMOL/L
PROT SERPL-MCNC: 6.8 G/DL
RBC # BLD: 5.64 M/UL — HIGH (ref 3.8–5.2)
RBC # FLD: 17.5 % — HIGH (ref 10.3–14.5)
SODIUM SERPL-SCNC: 139 MMOL/L
WBC # BLD: 8.56 K/UL — SIGNIFICANT CHANGE UP (ref 3.8–10.5)
WBC # FLD AUTO: 8.56 K/UL — SIGNIFICANT CHANGE UP (ref 3.8–10.5)

## 2021-09-08 PROCEDURE — 99214 OFFICE O/P EST MOD 30 MIN: CPT

## 2021-09-09 NOTE — HISTORY OF PRESENT ILLNESS
[Disease: _____________________] : Disease: [unfilled] [AJCC Stage: ____] : AJCC Stage: [unfilled] [de-identified] : 70F hx of asthma, arthritis and dx of Stage II FIGO Grade 1 endometrial ca presenting for initial consultation for recurrent metastatic disease.\par \par Prior onc care and surgery were performed in [place]. Pt was initially diagnosed in 2016 after she presented with PMB. On 1/4/17, she underwent a laparoscopic hysterectomy and bilateral salpingo-oophorectomy (Dr. Combs- [place]); there was no node dissection. There was extensive ASIF due to prior surgery. Pathology: endometrioid adenocarcinoma FIGO grade 1 with villoglandular features. The carcinoma superficially invaded the myometrium. Depth of myometrial invasion was less then 50%. Negative for lymphovascular invasion. Carcinoma extensively involved the endocervical glands with cervical mucosal and stromal invasion. Peritoneal cytology was benign. (Clinical stage II FIGO grade 1). In 4/2017, pt completed vaginal brachytherapy (Dr. Manolo Del Real -[place]).\par Since then, pt has been following with Dr. Russ from Gyn/Onc.\par She underwent a PET /CT 8/31/20, which showed increase in size and number of lung nodules, measuring up to 1 cm in RUL, 0.9cm in JORGE LUIS; suspicious for metastatic disease. There was also asymmetric FDG avidity of the left base of tongue/vallecula for which pt will obtain an ENT eval.\par On 9/17/20, pt underwent R Vats with wedge resection x 3 per CT surgery. 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 \par \par  [FreeTextEntry1] : Anastrazole - March 2021 [de-identified] : Patient is here for follow up.  Her nausea subsided in June.  She still has a decreased appetite and cannot eat breakfast, usually has her first meal around 1pm each day.  She continues to have chronic b/l knee pain related to her previous knee replacements.  She takes her BP at home each night and says it has been normal.  She has some fatigue.  She plans to join a gym to have more regular exercise.  Denies abdominal pain, bloating, nausea, vomiting, change in bowel habits.

## 2021-09-14 ENCOUNTER — RX RENEWAL (OUTPATIENT)
Age: 71
End: 2021-09-14

## 2021-09-14 ENCOUNTER — RESULT REVIEW (OUTPATIENT)
Age: 71
End: 2021-09-14

## 2021-09-20 ENCOUNTER — NON-APPOINTMENT (OUTPATIENT)
Age: 71
End: 2021-09-20

## 2021-09-27 ENCOUNTER — APPOINTMENT (OUTPATIENT)
Dept: OTOLARYNGOLOGY | Facility: CLINIC | Age: 71
End: 2021-09-27
Payer: MEDICARE

## 2021-09-27 VITALS
WEIGHT: 170 LBS | TEMPERATURE: 97.5 F | HEART RATE: 86 BPM | HEIGHT: 61 IN | SYSTOLIC BLOOD PRESSURE: 162 MMHG | BODY MASS INDEX: 32.1 KG/M2 | DIASTOLIC BLOOD PRESSURE: 91 MMHG | OXYGEN SATURATION: 97 %

## 2021-09-27 PROCEDURE — 31575 DIAGNOSTIC LARYNGOSCOPY: CPT

## 2021-09-27 PROCEDURE — 99214 OFFICE O/P EST MOD 30 MIN: CPT | Mod: 25

## 2021-09-27 NOTE — PROCEDURE
[Hoarseness] : hoarseness not clearly evaluated by indirect laryngoscopy [None] : none [Flexible Endoscope] : examined with the flexible endoscope [Serial Number: ___] : Serial Number: [unfilled] [de-identified] : No lesions in the NPx, OPx, HPx or larynx. VC are mobile, airway patent. She has a small anterior glottic gap on phonation.

## 2021-09-27 NOTE — HISTORY OF PRESENT ILLNESS
[de-identified] : pt with vocal cord polyp ( removed 1995 and 2014)asthma, arthritis and dx of Stage II FIGO Grade 1 endometrial ca and  refer by Dr. Hammer for abnormal finding on PET CT.  pet ct showed 2.  Indeterminate asymmetric FDG avidity left base of tongue/vallecula. Suggest correlation with direct inspection. pt Dr. Agosto did scope and per pt did not find anything. pt also oral surgeon and did not find anything. pt completed chemo for endometrial cancer about 3/9/2021 and doing well. Last PET ct on 3/4/2021. She reports intermittent dysphonia which has been present since she underwent thyroid surgery in [place] about 5 years ago.  pt is here to f/u and still has no throat or neck c.o or dysphagia.

## 2021-09-28 ENCOUNTER — APPOINTMENT (OUTPATIENT)
Dept: ULTRASOUND IMAGING | Facility: CLINIC | Age: 71
End: 2021-09-28
Payer: MEDICARE

## 2021-09-28 ENCOUNTER — OUTPATIENT (OUTPATIENT)
Dept: OUTPATIENT SERVICES | Facility: HOSPITAL | Age: 71
LOS: 1 days | End: 2021-09-28
Payer: MEDICARE

## 2021-09-28 ENCOUNTER — APPOINTMENT (OUTPATIENT)
Dept: MAMMOGRAPHY | Facility: CLINIC | Age: 71
End: 2021-09-28
Payer: MEDICARE

## 2021-09-28 ENCOUNTER — RESULT REVIEW (OUTPATIENT)
Age: 71
End: 2021-09-28

## 2021-09-28 DIAGNOSIS — Z98.890 OTHER SPECIFIED POSTPROCEDURAL STATES: Chronic | ICD-10-CM

## 2021-09-28 DIAGNOSIS — Z90.49 ACQUIRED ABSENCE OF OTHER SPECIFIED PARTS OF DIGESTIVE TRACT: Chronic | ICD-10-CM

## 2021-09-28 DIAGNOSIS — N84.0 POLYP OF CORPUS UTERI: Chronic | ICD-10-CM

## 2021-09-28 DIAGNOSIS — Z90.710 ACQUIRED ABSENCE OF BOTH CERVIX AND UTERUS: Chronic | ICD-10-CM

## 2021-09-28 DIAGNOSIS — Z00.8 ENCOUNTER FOR OTHER GENERAL EXAMINATION: ICD-10-CM

## 2021-09-28 DIAGNOSIS — D22.9 MELANOCYTIC NEVI, UNSPECIFIED: Chronic | ICD-10-CM

## 2021-09-28 DIAGNOSIS — Z96.659 PRESENCE OF UNSPECIFIED ARTIFICIAL KNEE JOINT: Chronic | ICD-10-CM

## 2021-09-28 DIAGNOSIS — M20.40 OTHER HAMMER TOE(S) (ACQUIRED), UNSPECIFIED FOOT: Chronic | ICD-10-CM

## 2021-09-28 DIAGNOSIS — Z90.89 ACQUIRED ABSENCE OF OTHER ORGANS: Chronic | ICD-10-CM

## 2021-09-28 PROCEDURE — 77063 BREAST TOMOSYNTHESIS BI: CPT

## 2021-09-28 PROCEDURE — 77067 SCR MAMMO BI INCL CAD: CPT | Mod: 26

## 2021-09-28 PROCEDURE — 76641 ULTRASOUND BREAST COMPLETE: CPT | Mod: 26,50

## 2021-09-28 PROCEDURE — 77063 BREAST TOMOSYNTHESIS BI: CPT | Mod: 26

## 2021-09-28 PROCEDURE — 76641 ULTRASOUND BREAST COMPLETE: CPT

## 2021-09-28 PROCEDURE — 77067 SCR MAMMO BI INCL CAD: CPT

## 2021-10-01 ENCOUNTER — APPOINTMENT (OUTPATIENT)
Dept: CT IMAGING | Facility: CLINIC | Age: 71
End: 2021-10-01
Payer: MEDICARE

## 2021-10-01 ENCOUNTER — OUTPATIENT (OUTPATIENT)
Dept: OUTPATIENT SERVICES | Facility: HOSPITAL | Age: 71
LOS: 1 days | End: 2021-10-01
Payer: MEDICARE

## 2021-10-01 DIAGNOSIS — Z90.710 ACQUIRED ABSENCE OF BOTH CERVIX AND UTERUS: Chronic | ICD-10-CM

## 2021-10-01 DIAGNOSIS — Z90.89 ACQUIRED ABSENCE OF OTHER ORGANS: Chronic | ICD-10-CM

## 2021-10-01 DIAGNOSIS — Z98.890 OTHER SPECIFIED POSTPROCEDURAL STATES: Chronic | ICD-10-CM

## 2021-10-01 DIAGNOSIS — Z96.659 PRESENCE OF UNSPECIFIED ARTIFICIAL KNEE JOINT: Chronic | ICD-10-CM

## 2021-10-01 DIAGNOSIS — N84.0 POLYP OF CORPUS UTERI: Chronic | ICD-10-CM

## 2021-10-01 DIAGNOSIS — D22.9 MELANOCYTIC NEVI, UNSPECIFIED: Chronic | ICD-10-CM

## 2021-10-01 DIAGNOSIS — Z90.49 ACQUIRED ABSENCE OF OTHER SPECIFIED PARTS OF DIGESTIVE TRACT: Chronic | ICD-10-CM

## 2021-10-01 DIAGNOSIS — M20.40 OTHER HAMMER TOE(S) (ACQUIRED), UNSPECIFIED FOOT: Chronic | ICD-10-CM

## 2021-10-01 DIAGNOSIS — C55 MALIGNANT NEOPLASM OF UTERUS, PART UNSPECIFIED: ICD-10-CM

## 2021-10-01 PROCEDURE — 74176 CT ABD & PELVIS W/O CONTRAST: CPT

## 2021-10-01 PROCEDURE — 74176 CT ABD & PELVIS W/O CONTRAST: CPT | Mod: 26,MH

## 2021-10-01 PROCEDURE — 71250 CT THORAX DX C-: CPT

## 2021-10-01 PROCEDURE — 71250 CT THORAX DX C-: CPT | Mod: 26,MH

## 2021-10-05 ENCOUNTER — APPOINTMENT (OUTPATIENT)
Dept: GYNECOLOGIC ONCOLOGY | Facility: CLINIC | Age: 71
End: 2021-10-05
Payer: MEDICARE

## 2021-10-05 VITALS
HEIGHT: 61 IN | HEART RATE: 97 BPM | SYSTOLIC BLOOD PRESSURE: 160 MMHG | DIASTOLIC BLOOD PRESSURE: 91 MMHG | BODY MASS INDEX: 32.47 KG/M2 | WEIGHT: 172 LBS

## 2021-10-05 DIAGNOSIS — Z87.898 PERSONAL HISTORY OF OTHER SPECIFIED CONDITIONS: ICD-10-CM

## 2021-10-05 PROCEDURE — 99213 OFFICE O/P EST LOW 20 MIN: CPT

## 2021-10-05 RX ORDER — ALBUTEROL SULFATE 90 UG/1
108 (90 BASE) AEROSOL, METERED RESPIRATORY (INHALATION)
Qty: 1 | Refills: 3 | Status: DISCONTINUED | COMMUNITY
Start: 2021-05-18 | End: 2021-10-05

## 2021-10-11 ENCOUNTER — TRANSCRIPTION ENCOUNTER (OUTPATIENT)
Age: 71
End: 2021-10-11

## 2021-10-12 ENCOUNTER — TRANSCRIPTION ENCOUNTER (OUTPATIENT)
Age: 71
End: 2021-10-12

## 2021-10-13 ENCOUNTER — RX RENEWAL (OUTPATIENT)
Age: 71
End: 2021-10-13

## 2021-11-10 ENCOUNTER — APPOINTMENT (OUTPATIENT)
Dept: INTERNAL MEDICINE | Facility: CLINIC | Age: 71
End: 2021-11-10
Payer: MEDICARE

## 2021-11-10 VITALS
RESPIRATION RATE: 14 BRPM | SYSTOLIC BLOOD PRESSURE: 132 MMHG | TEMPERATURE: 97.2 F | WEIGHT: 170 LBS | OXYGEN SATURATION: 97 % | HEART RATE: 79 BPM | DIASTOLIC BLOOD PRESSURE: 80 MMHG | BODY MASS INDEX: 32.12 KG/M2

## 2021-11-10 DIAGNOSIS — M25.511 PAIN IN RIGHT SHOULDER: ICD-10-CM

## 2021-11-10 PROCEDURE — 99214 OFFICE O/P EST MOD 30 MIN: CPT

## 2021-11-10 NOTE — HISTORY OF PRESENT ILLNESS
[de-identified] : History of endometrial ca- followed by GYN Onc\par History of pulmonary nodules- followed by pulmonary\par Followed by derm- for frequent hives.

## 2021-11-10 NOTE — ASSESSMENT
[FreeTextEntry1] : Endometria ca- follow up with GYN Onc\par pulmonary nodules- follow up with pulmonary\par HTN- diet controlled. \par check labs

## 2021-11-11 LAB
25(OH)D3 SERPL-MCNC: 46 NG/ML
ALBUMIN SERPL ELPH-MCNC: 4.7 G/DL
ALP BLD-CCNC: 115 U/L
ALT SERPL-CCNC: 29 U/L
ANION GAP SERPL CALC-SCNC: 13 MMOL/L
AST SERPL-CCNC: 37 U/L
BASOPHILS # BLD AUTO: 0.06 K/UL
BASOPHILS NFR BLD AUTO: 0.8 %
BILIRUB SERPL-MCNC: 0.6 MG/DL
BUN SERPL-MCNC: 17 MG/DL
CALCIUM SERPL-MCNC: 10 MG/DL
CHLORIDE SERPL-SCNC: 101 MMOL/L
CHOLEST SERPL-MCNC: 229 MG/DL
CO2 SERPL-SCNC: 25 MMOL/L
COVID-19 SPIKE DOMAIN ANTIBODY INTERPRETATION: POSITIVE
CREAT SERPL-MCNC: 0.72 MG/DL
EOSINOPHIL # BLD AUTO: 0.03 K/UL
EOSINOPHIL NFR BLD AUTO: 0.4 %
ESTIMATED AVERAGE GLUCOSE: 114 MG/DL
FOLATE SERPL-MCNC: 19.5 NG/ML
GLUCOSE SERPL-MCNC: 102 MG/DL
HBA1C MFR BLD HPLC: 5.6 %
HCT VFR BLD CALC: 36.4 %
HDLC SERPL-MCNC: 78 MG/DL
HGB BLD-MCNC: 11.1 G/DL
IMM GRANULOCYTES NFR BLD AUTO: 0.3 %
LDLC SERPL CALC-MCNC: 131 MG/DL
LYMPHOCYTES # BLD AUTO: 2.06 K/UL
LYMPHOCYTES NFR BLD AUTO: 27.5 %
MAN DIFF?: NORMAL
MCHC RBC-ENTMCNC: 19 PG
MCHC RBC-ENTMCNC: 30.5 GM/DL
MCV RBC AUTO: 62.3 FL
MONOCYTES # BLD AUTO: 0.48 K/UL
MONOCYTES NFR BLD AUTO: 6.4 %
NEUTROPHILS # BLD AUTO: 4.85 K/UL
NEUTROPHILS NFR BLD AUTO: 64.6 %
NONHDLC SERPL-MCNC: 151 MG/DL
PLATELET # BLD AUTO: 242 K/UL
POTASSIUM SERPL-SCNC: 4.4 MMOL/L
PROT SERPL-MCNC: 7.2 G/DL
RBC # BLD: 5.84 M/UL
RBC # FLD: 18.2 %
SARS-COV-2 AB SERPL IA-ACNC: >250 U/ML
SODIUM SERPL-SCNC: 139 MMOL/L
TRIGL SERPL-MCNC: 101 MG/DL
TSH SERPL-ACNC: 1.56 UIU/ML
VIT B12 SERPL-MCNC: 754 PG/ML
WBC # FLD AUTO: 7.5 K/UL

## 2021-12-06 ENCOUNTER — NON-APPOINTMENT (OUTPATIENT)
Age: 71
End: 2021-12-06

## 2021-12-06 ENCOUNTER — TRANSCRIPTION ENCOUNTER (OUTPATIENT)
Age: 71
End: 2021-12-06

## 2021-12-14 ENCOUNTER — NON-APPOINTMENT (OUTPATIENT)
Age: 71
End: 2021-12-14

## 2021-12-20 ENCOUNTER — TRANSCRIPTION ENCOUNTER (OUTPATIENT)
Age: 71
End: 2021-12-20

## 2021-12-20 DIAGNOSIS — Z11.59 ENCOUNTER FOR SCREENING FOR OTHER VIRAL DISEASES: ICD-10-CM

## 2021-12-22 ENCOUNTER — APPOINTMENT (OUTPATIENT)
Dept: INTERNAL MEDICINE | Facility: CLINIC | Age: 71
End: 2021-12-22

## 2022-01-03 ENCOUNTER — TRANSCRIPTION ENCOUNTER (OUTPATIENT)
Age: 72
End: 2022-01-03

## 2022-01-04 ENCOUNTER — OUTPATIENT (OUTPATIENT)
Dept: OUTPATIENT SERVICES | Facility: HOSPITAL | Age: 72
LOS: 1 days | Discharge: HOME | End: 2022-01-04

## 2022-01-04 DIAGNOSIS — D22.9 MELANOCYTIC NEVI, UNSPECIFIED: Chronic | ICD-10-CM

## 2022-01-04 DIAGNOSIS — Z98.890 OTHER SPECIFIED POSTPROCEDURAL STATES: Chronic | ICD-10-CM

## 2022-01-04 DIAGNOSIS — C54.1 MALIGNANT NEOPLASM OF ENDOMETRIUM: ICD-10-CM

## 2022-01-04 DIAGNOSIS — M20.40 OTHER HAMMER TOE(S) (ACQUIRED), UNSPECIFIED FOOT: Chronic | ICD-10-CM

## 2022-01-04 DIAGNOSIS — Z90.49 ACQUIRED ABSENCE OF OTHER SPECIFIED PARTS OF DIGESTIVE TRACT: Chronic | ICD-10-CM

## 2022-01-04 DIAGNOSIS — Z90.89 ACQUIRED ABSENCE OF OTHER ORGANS: Chronic | ICD-10-CM

## 2022-01-04 DIAGNOSIS — N84.0 POLYP OF CORPUS UTERI: Chronic | ICD-10-CM

## 2022-01-04 DIAGNOSIS — Z96.659 PRESENCE OF UNSPECIFIED ARTIFICIAL KNEE JOINT: Chronic | ICD-10-CM

## 2022-01-04 DIAGNOSIS — Z90.710 ACQUIRED ABSENCE OF BOTH CERVIX AND UTERUS: Chronic | ICD-10-CM

## 2022-01-05 ENCOUNTER — APPOINTMENT (OUTPATIENT)
Dept: HEMATOLOGY ONCOLOGY | Facility: CLINIC | Age: 72
End: 2022-01-05
Payer: MEDICARE

## 2022-01-05 PROCEDURE — 99213 OFFICE O/P EST LOW 20 MIN: CPT | Mod: 95

## 2022-01-06 NOTE — HISTORY OF PRESENT ILLNESS
[Disease: _____________________] : Disease: [unfilled] [AJCC Stage: ____] : AJCC Stage: [unfilled] [Treatment Protocol] : Treatment Protocol [de-identified] : 70F hx of asthma, arthritis and dx of Stage II FIGO Grade 1 endometrial ca presenting for recurrent metastatic disease.\par \par Prior onc care and surgery were performed in [place]. Pt was initially diagnosed in 2016 after she presented with PMB. On 1/4/17, she underwent a laparoscopic hysterectomy and bilateral salpingo-oophorectomy (Dr. Combs- [place]); there was no node dissection. There was extensive ASIF due to prior surgery. Pathology: endometrioid adenocarcinoma FIGO grade 1 with villoglandular features. The carcinoma superficially invaded the myometrium. Depth of myometrial invasion was less then 50%. Negative for lymphovascular invasion. Carcinoma extensively involved the endocervical glands with cervical mucosal and stromal invasion. Peritoneal cytology was benign. (Clinical stage II FIGO grade 1). In 4/2017, pt completed vaginal brachytherapy (Dr. Manolo Del Real -[place]).\par Since then, pt has been following with Dr. Russ from Gyn/Onc.\par She underwent a PET /CT 8/31/20, which showed increase in size and number of lung nodules, measuring up to 1 cm in RUL, 0.9cm in JORGE LUIS; suspicious for metastatic disease. There was also asymmetric FDG avidity of the left base of tongue/vallecula for which pt will obtain an ENT eval.\par On 9/17/20, pt underwent R Vats with wedge resection x 3 per CT surgery. 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 [FreeTextEntry1] : Anastrazole [de-identified] : Restarted anastrazole and feeling much better. Taking it every other day since 12/20. Back pain improved. Nausea improved and appetite improved. Eating at least 2 meals per day. Less constipated. Denies fevers, chills, CP, dyspnea, diarrhea, pain, dysuria. \par Planning to move to [place] in the near future.

## 2022-01-06 NOTE — REASON FOR VISIT
[Follow-Up Visit] : a follow-up [Home] : at home, [unfilled] , at the time of the visit. [Medical Office: ([facility])___] : at the medical office located in  [Verbal consent obtained from patient] : the patient, [unfilled]

## 2022-01-06 NOTE — ASSESSMENT
[FreeTextEntry1] :  · Relapsed EMCA on AI maintenance.\par     Tolerating treatment well with every other day dosing. \par     Knee pain from arthritis, unlikely from AI.\par     Nausea resolved, reviewed diet and small frequent meals to maintain weight.\par     Reviewed signs and symptoms of recurrence.\par     Plan to move to [place] in early to late spring. Will let us know when she moves to transfer records. \par     Due for scans in April 2022. \par     RTC in three months.

## 2022-02-08 ENCOUNTER — APPOINTMENT (OUTPATIENT)
Dept: GYNECOLOGIC ONCOLOGY | Facility: CLINIC | Age: 72
End: 2022-02-08
Payer: MEDICARE

## 2022-02-08 VITALS
DIASTOLIC BLOOD PRESSURE: 84 MMHG | HEIGHT: 61 IN | SYSTOLIC BLOOD PRESSURE: 119 MMHG | BODY MASS INDEX: 32.47 KG/M2 | WEIGHT: 172 LBS | HEART RATE: 94 BPM

## 2022-02-08 DIAGNOSIS — Z08 ENCOUNTER FOR FOLLOW-UP EXAMINATION AFTER COMPLETED TREATMENT FOR MALIGNANT NEOPLASM: ICD-10-CM

## 2022-02-08 DIAGNOSIS — Z85.42 ENCOUNTER FOR FOLLOW-UP EXAMINATION AFTER COMPLETED TREATMENT FOR MALIGNANT NEOPLASM: ICD-10-CM

## 2022-02-08 PROCEDURE — 99213 OFFICE O/P EST LOW 20 MIN: CPT

## 2022-02-08 RX ORDER — MULTIVIT-MIN/IRON/FOLIC ACID/K 18-600-40
500 CAPSULE ORAL
Qty: 30 | Refills: 5 | Status: DISCONTINUED | COMMUNITY
Start: 2020-10-16 | End: 2022-02-08

## 2022-02-08 RX ORDER — ANASTROZOLE TABLETS 1 MG/1
1 TABLET ORAL
Qty: 30 | Refills: 0 | Status: DISCONTINUED | COMMUNITY
Start: 2021-03-17 | End: 2022-02-08

## 2022-03-21 NOTE — DISCUSSION/SUMMARY
[FreeTextEntry1] : This is a 70-year-old white female without any known history of heart disease except for VPCs. She had one infusion of chemotherapy for metastatic endometrial cancer which was complicated by chest heaviness and jaw pain. She is concerned that she would get this symptom again. She is getting chemotherapy every 3 weeks. In order to be sure that this is noncardiac she'll undergo a stress test. Since the ECG portion of the test was positive in the past she'll schedule a nuclear stress test. Assuming this is okay she can safely undergo future chemotherapy treatment. She also will schedule an echocardiogram to make sure heart is structurally normal.\par \par This was discussed with the patient I answered her questions. He does monitor blood pressure at home he gets normal readings Detail Level: Detailed Quality 226: Preventive Care And Screening: Tobacco Use: Screening And Cessation Intervention: Patient screened for tobacco use and is an ex/non-smoker Quality 431: Preventive Care And Screening: Unhealthy Alcohol Use - Screening: Patient screened for unhealthy alcohol use using a single question and scores less than 2 times per year Quality 130: Documentation Of Current Medications In The Medical Record: Current Medications Documented

## 2022-03-22 ENCOUNTER — APPOINTMENT (OUTPATIENT)
Dept: NUCLEAR MEDICINE | Facility: CLINIC | Age: 72
End: 2022-03-22
Payer: MEDICARE

## 2022-03-22 ENCOUNTER — OUTPATIENT (OUTPATIENT)
Dept: OUTPATIENT SERVICES | Facility: HOSPITAL | Age: 72
LOS: 1 days | End: 2022-03-22
Payer: MEDICARE

## 2022-03-22 DIAGNOSIS — Z90.89 ACQUIRED ABSENCE OF OTHER ORGANS: Chronic | ICD-10-CM

## 2022-03-22 DIAGNOSIS — Z90.710 ACQUIRED ABSENCE OF BOTH CERVIX AND UTERUS: Chronic | ICD-10-CM

## 2022-03-22 DIAGNOSIS — Z98.890 OTHER SPECIFIED POSTPROCEDURAL STATES: Chronic | ICD-10-CM

## 2022-03-22 DIAGNOSIS — Z90.49 ACQUIRED ABSENCE OF OTHER SPECIFIED PARTS OF DIGESTIVE TRACT: Chronic | ICD-10-CM

## 2022-03-22 DIAGNOSIS — D22.9 MELANOCYTIC NEVI, UNSPECIFIED: Chronic | ICD-10-CM

## 2022-03-22 DIAGNOSIS — N84.0 POLYP OF CORPUS UTERI: Chronic | ICD-10-CM

## 2022-03-22 DIAGNOSIS — Z96.659 PRESENCE OF UNSPECIFIED ARTIFICIAL KNEE JOINT: Chronic | ICD-10-CM

## 2022-03-22 DIAGNOSIS — M20.40 OTHER HAMMER TOE(S) (ACQUIRED), UNSPECIFIED FOOT: Chronic | ICD-10-CM

## 2022-03-22 DIAGNOSIS — C55 MALIGNANT NEOPLASM OF UTERUS, PART UNSPECIFIED: ICD-10-CM

## 2022-03-22 PROCEDURE — A9552: CPT

## 2022-03-22 PROCEDURE — 78815 PET IMAGE W/CT SKULL-THIGH: CPT

## 2022-03-22 PROCEDURE — 78815 PET IMAGE W/CT SKULL-THIGH: CPT | Mod: 26,PS,MH

## 2022-03-28 ENCOUNTER — APPOINTMENT (OUTPATIENT)
Dept: OTOLARYNGOLOGY | Facility: CLINIC | Age: 72
End: 2022-03-28
Payer: MEDICARE

## 2022-03-28 VITALS
HEART RATE: 95 BPM | DIASTOLIC BLOOD PRESSURE: 79 MMHG | SYSTOLIC BLOOD PRESSURE: 156 MMHG | WEIGHT: 171 LBS | BODY MASS INDEX: 32.28 KG/M2 | HEIGHT: 61 IN | OXYGEN SATURATION: 96 %

## 2022-03-28 DIAGNOSIS — R49.0 DYSPHONIA: ICD-10-CM

## 2022-03-28 DIAGNOSIS — J39.2 OTHER DISEASES OF PHARYNX: ICD-10-CM

## 2022-03-28 PROCEDURE — 99214 OFFICE O/P EST MOD 30 MIN: CPT | Mod: 25

## 2022-03-28 PROCEDURE — 31575 DIAGNOSTIC LARYNGOSCOPY: CPT

## 2022-03-28 NOTE — HISTORY OF PRESENT ILLNESS
[de-identified] : pt with vocal cord polyp ( removed 1995 and 2014)asthma, arthritis and dx of Stage II FIGO Grade 1 endometrial ca and  refer by Dr. Hammer for abnormal finding on PET CT.  pet ct showed-  Indeterminate asymmetric FDG avidity left base of tongue/vallecula. Suggest correlation with direct inspection. pt Dr. Agosto did scope and per pt did not find anything. pt also oral surgeon and did not find anything. pt completed chemo for endometrial cancer about 3/9/2021 and doing well. She reports intermittent dysphonia which has been present since she underwent thyroid surgery in [place] about 5 years ago.  pt is here to f/u and still has no throat or neck c.o or dysphagia. repeat PET ct on 3/2022  Unchanged asymmetric FDG avidity in the left vallecula/base of tongue, which remains indeterminate. Correlate clinically. pt is going to move to [place] end of 4/2022 or early 5/2022.\par

## 2022-03-28 NOTE — PROCEDURE
[Hoarseness] : hoarseness not clearly evaluated by indirect laryngoscopy [None] : none [Flexible Endoscope] : examined with the flexible endoscope [Serial Number: ___] : Serial Number: [unfilled] [de-identified] : No lesions in the NPx, OPx, HPx or larynx. VC are mobile, airway patent. She has a small anterior glottic gap on phonation. \par

## 2022-03-29 ENCOUNTER — TRANSCRIPTION ENCOUNTER (OUTPATIENT)
Age: 72
End: 2022-03-29

## 2022-03-30 ENCOUNTER — NON-APPOINTMENT (OUTPATIENT)
Age: 72
End: 2022-03-30

## 2022-03-30 DIAGNOSIS — Z00.00 ENCOUNTER FOR GENERAL ADULT MEDICAL EXAMINATION W/OUT ABNORMAL FINDINGS: ICD-10-CM

## 2022-03-30 DIAGNOSIS — G89.4 CHRONIC PAIN SYNDROME: ICD-10-CM

## 2022-04-01 LAB
25(OH)D3 SERPL-MCNC: 53.1 NG/ML
ALBUMIN SERPL ELPH-MCNC: 4.6 G/DL
ALP BLD-CCNC: 109 U/L
ALT SERPL-CCNC: 16 U/L
ANION GAP SERPL CALC-SCNC: 14 MMOL/L
APPEARANCE: CLEAR
AST SERPL-CCNC: 25 U/L
BACTERIA: NEGATIVE
BASOPHILS # BLD AUTO: 0.06 K/UL
BASOPHILS NFR BLD AUTO: 0.8 %
BILIRUB SERPL-MCNC: 0.6 MG/DL
BILIRUBIN URINE: NEGATIVE
BLOOD URINE: NEGATIVE
BUN SERPL-MCNC: 14 MG/DL
CALCIUM SERPL-MCNC: 9.9 MG/DL
CHLORIDE SERPL-SCNC: 101 MMOL/L
CHOLEST SERPL-MCNC: 215 MG/DL
CO2 SERPL-SCNC: 26 MMOL/L
COLOR: YELLOW
CREAT SERPL-MCNC: 0.67 MG/DL
EGFR: 94 ML/MIN/1.73M2
EOSINOPHIL # BLD AUTO: 0.05 K/UL
EOSINOPHIL NFR BLD AUTO: 0.7 %
ESTIMATED AVERAGE GLUCOSE: 120 MG/DL
FOLATE SERPL-MCNC: >20 NG/ML
GLUCOSE QUALITATIVE U: NEGATIVE
GLUCOSE SERPL-MCNC: 97 MG/DL
HBA1C MFR BLD HPLC: 5.8 %
HCT VFR BLD CALC: 36.5 %
HDLC SERPL-MCNC: 66 MG/DL
HGB BLD-MCNC: 10.8 G/DL
HYALINE CASTS: 0 /LPF
IMM GRANULOCYTES NFR BLD AUTO: 1.1 %
KETONES URINE: NEGATIVE
LDLC SERPL CALC-MCNC: 126 MG/DL
LEUKOCYTE ESTERASE URINE: NEGATIVE
LYMPHOCYTES # BLD AUTO: 2.07 K/UL
LYMPHOCYTES NFR BLD AUTO: 29.1 %
MAN DIFF?: NORMAL
MCHC RBC-ENTMCNC: 18.5 PG
MCHC RBC-ENTMCNC: 29.6 GM/DL
MCV RBC AUTO: 62.4 FL
MICROSCOPIC-UA: NORMAL
MONOCYTES # BLD AUTO: 0.54 K/UL
MONOCYTES NFR BLD AUTO: 7.6 %
NEUTROPHILS # BLD AUTO: 4.31 K/UL
NEUTROPHILS NFR BLD AUTO: 60.7 %
NITRITE URINE: NEGATIVE
NONHDLC SERPL-MCNC: 149 MG/DL
PH URINE: 7
PLATELET # BLD AUTO: 249 K/UL
POTASSIUM SERPL-SCNC: 4.7 MMOL/L
PROT SERPL-MCNC: 7 G/DL
PROTEIN URINE: NORMAL
RBC # BLD: 5.85 M/UL
RBC # FLD: 17.6 %
RED BLOOD CELLS URINE: 4 /HPF
SODIUM SERPL-SCNC: 141 MMOL/L
SPECIFIC GRAVITY URINE: 1.02
SQUAMOUS EPITHELIAL CELLS: 0 /HPF
TRIGL SERPL-MCNC: 114 MG/DL
TSH SERPL-ACNC: 2.23 UIU/ML
UROBILINOGEN URINE: NORMAL
VIT B12 SERPL-MCNC: 898 PG/ML
WBC # FLD AUTO: 7.11 K/UL
WHITE BLOOD CELLS URINE: 2 /HPF

## 2022-04-05 ENCOUNTER — OUTPATIENT (OUTPATIENT)
Dept: OUTPATIENT SERVICES | Facility: HOSPITAL | Age: 72
LOS: 1 days | Discharge: HOME | End: 2022-04-05

## 2022-04-05 DIAGNOSIS — C54.1 MALIGNANT NEOPLASM OF ENDOMETRIUM: ICD-10-CM

## 2022-04-05 DIAGNOSIS — Z90.710 ACQUIRED ABSENCE OF BOTH CERVIX AND UTERUS: Chronic | ICD-10-CM

## 2022-04-05 DIAGNOSIS — Z98.890 OTHER SPECIFIED POSTPROCEDURAL STATES: Chronic | ICD-10-CM

## 2022-04-05 DIAGNOSIS — N84.0 POLYP OF CORPUS UTERI: Chronic | ICD-10-CM

## 2022-04-05 DIAGNOSIS — Z90.89 ACQUIRED ABSENCE OF OTHER ORGANS: Chronic | ICD-10-CM

## 2022-04-05 DIAGNOSIS — Z96.659 PRESENCE OF UNSPECIFIED ARTIFICIAL KNEE JOINT: Chronic | ICD-10-CM

## 2022-04-05 DIAGNOSIS — M20.40 OTHER HAMMER TOE(S) (ACQUIRED), UNSPECIFIED FOOT: Chronic | ICD-10-CM

## 2022-04-05 DIAGNOSIS — D22.9 MELANOCYTIC NEVI, UNSPECIFIED: Chronic | ICD-10-CM

## 2022-04-05 DIAGNOSIS — Z90.49 ACQUIRED ABSENCE OF OTHER SPECIFIED PARTS OF DIGESTIVE TRACT: Chronic | ICD-10-CM

## 2022-04-06 ENCOUNTER — APPOINTMENT (OUTPATIENT)
Dept: INTERNAL MEDICINE | Facility: CLINIC | Age: 72
End: 2022-04-06
Payer: MEDICARE

## 2022-04-06 VITALS
DIASTOLIC BLOOD PRESSURE: 74 MMHG | BODY MASS INDEX: 32.1 KG/M2 | WEIGHT: 170 LBS | HEIGHT: 61 IN | SYSTOLIC BLOOD PRESSURE: 143 MMHG | OXYGEN SATURATION: 97 % | HEART RATE: 106 BPM

## 2022-04-06 DIAGNOSIS — R91.8 OTHER NONSPECIFIC ABNORMAL FINDING OF LUNG FIELD: ICD-10-CM

## 2022-04-06 PROCEDURE — 99214 OFFICE O/P EST MOD 30 MIN: CPT

## 2022-04-06 RX ORDER — EPINEPHRINE 0.3 MG/.3ML
0.3 INJECTION INTRAMUSCULAR
Qty: 2 | Refills: 0 | Status: ACTIVE | COMMUNITY
Start: 2019-07-17 | End: 1900-01-01

## 2022-04-06 NOTE — ASSESSMENT
[FreeTextEntry1] : Endometrial CA- follow up with GYN Onc\par Pulmonary nodules- follow up with pulmonary\par Stresses' low sugar diet\par Follow up with [place]

## 2022-04-06 NOTE — HISTORY OF PRESENT ILLNESS
[de-identified] : Pt here today for follow up.\par Followed by GYN Onc for endometrial caner and doing very well. \par History of pulmonary nodules- followed by pulmonary\par No new complaints\par moving to [place]

## 2022-04-08 ENCOUNTER — APPOINTMENT (OUTPATIENT)
Dept: HEMATOLOGY ONCOLOGY | Facility: CLINIC | Age: 72
End: 2022-04-08
Payer: MEDICARE

## 2022-04-08 VITALS
TEMPERATURE: 97.6 F | HEART RATE: 107 BPM | HEIGHT: 62 IN | WEIGHT: 171.98 LBS | RESPIRATION RATE: 16 BRPM | SYSTOLIC BLOOD PRESSURE: 145 MMHG | OXYGEN SATURATION: 97 % | BODY MASS INDEX: 31.65 KG/M2 | DIASTOLIC BLOOD PRESSURE: 81 MMHG

## 2022-04-08 DIAGNOSIS — C55 MALIGNANT NEOPLASM OF UTERUS, PART UNSPECIFIED: ICD-10-CM

## 2022-04-08 PROCEDURE — 99213 OFFICE O/P EST LOW 20 MIN: CPT

## 2022-04-08 RX ORDER — HYDROXYZINE HYDROCHLORIDE 25 MG/1
25 TABLET ORAL
Qty: 30 | Refills: 0 | Status: ACTIVE | COMMUNITY
Start: 2021-10-19

## 2022-04-08 RX ORDER — KETOCONAZOLE 20 MG/G
2 CREAM TOPICAL
Qty: 60 | Refills: 0 | Status: COMPLETED | COMMUNITY
Start: 2021-11-17

## 2022-04-08 NOTE — HISTORY OF PRESENT ILLNESS
[Disease: _____________________] : Disease: [unfilled] [AJCC Stage: ____] : AJCC Stage: [unfilled] [Treatment Protocol] : Treatment Protocol [de-identified] : 70F hx of asthma, arthritis and dx of Stage II FIGO Grade 1 endometrial ca presenting for recurrent metastatic disease.\par \par Prior onc care and surgery were performed in [place]. Pt was initially diagnosed in 2016 after she presented with PMB. On 1/4/17, she underwent a laparoscopic hysterectomy and bilateral salpingo-oophorectomy (Dr. Combs- [place]); there was no node dissection. There was extensive ASIF due to prior surgery. Pathology: endometrioid adenocarcinoma FIGO grade 1 with villoglandular features. The carcinoma superficially invaded the myometrium. Depth of myometrial invasion was less then 50%. Negative for lymphovascular invasion. Carcinoma extensively involved the endocervical glands with cervical mucosal and stromal invasion. Peritoneal cytology was benign. (Clinical stage II FIGO grade 1). In 4/2017, pt completed vaginal brachytherapy (Dr. Manolo Del Real -[place]).\par Since then, pt has been following with Dr. Russ from Gyn/Onc.\par She underwent a PET /CT 8/31/20, which showed increase in size and number of lung nodules, measuring up to 1 cm in RUL, 0.9cm in JORGE LUIS; suspicious for metastatic disease. There was also asymmetric FDG avidity of the left base of tongue/vallecula for which pt will obtain an ENT eval.\par On 9/17/20, pt underwent R Vats with wedge resection x 3 per CT surgery. 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 [FreeTextEntry1] : Anastrazole [de-identified] : Patient is here for follow up, overall doing well, will be moving to [place].\par She has a sinus infection, saw PCP.\par Constipation, takes Colace BID with good relief.\par Denies chest pain, SOB, cough, abdominal pain.

## 2022-04-09 ENCOUNTER — TRANSCRIPTION ENCOUNTER (OUTPATIENT)
Age: 72
End: 2022-04-09

## 2022-04-11 ENCOUNTER — TRANSCRIPTION ENCOUNTER (OUTPATIENT)
Age: 72
End: 2022-04-11

## 2022-04-11 DIAGNOSIS — J45.909 UNSPECIFIED ASTHMA, UNCOMPLICATED: ICD-10-CM

## 2022-04-11 PROBLEM — Z11.59 SCREENING FOR VIRAL DISEASE: Status: ACTIVE | Noted: 2021-05-06

## 2022-04-11 RX ORDER — FLUTICASONE PROPIONATE 50 UG/1
50 SPRAY, METERED NASAL
Qty: 3 | Refills: 3 | Status: ACTIVE | COMMUNITY
Start: 2022-04-11 | End: 1900-01-01

## 2022-04-28 ENCOUNTER — TRANSCRIPTION ENCOUNTER (OUTPATIENT)
Age: 72
End: 2022-04-28

## 2022-04-28 RX ORDER — ALBUTEROL SULFATE 90 UG/1
108 (90 BASE) AEROSOL, METERED RESPIRATORY (INHALATION)
Qty: 1 | Refills: 5 | Status: ACTIVE | COMMUNITY
Start: 1900-01-01 | End: 1900-01-01

## 2022-06-14 ENCOUNTER — APPOINTMENT (OUTPATIENT)
Dept: GYNECOLOGIC ONCOLOGY | Facility: CLINIC | Age: 72
End: 2022-06-14

## 2022-08-25 NOTE — H&P PST ADULT - SYMPTOMS
Have Your Skin Lesions Been Treated?: not been treated Is This A New Presentation, Or A Follow-Up?: Skin Lesion How Severe Is Your Skin Lesion?: mild none

## 2022-09-12 ENCOUNTER — NON-APPOINTMENT (OUTPATIENT)
Age: 72
End: 2022-09-12

## 2022-09-12 ENCOUNTER — TRANSCRIPTION ENCOUNTER (OUTPATIENT)
Age: 72
End: 2022-09-12

## 2022-09-14 ENCOUNTER — TRANSCRIPTION ENCOUNTER (OUTPATIENT)
Age: 72
End: 2022-09-14

## 2023-03-08 ENCOUNTER — TRANSCRIPTION ENCOUNTER (OUTPATIENT)
Age: 73
End: 2023-03-08

## 2023-12-12 NOTE — ASU PATIENT PROFILE, ADULT - BLOOD TRANSFUSION, PREVIOUS, PROFILE
"Occupational Therapy  Daily Treatment     Patient Name: Jeanie Hutchison  Age:  77 y.o., Sex:  female  Medical Record #: [id number]  Today's Date: 12/12/2023     Precautions  Precautions: Fall Risk, Swallow Precautions  Comments: Sz precautions; ETOH withdrawl    Assessment    Pt was seen for OT treatment. Attempted to see pt X2 today. First attempt, pt in with [place] and not available. Second attempt,  pt sleeping but agreed to participate in OT tx.  Sitter in room with pt. Pt required Min A/SBA for supine to sit at EOB. Pt needed assist to scoot to EOB. Pt demo increased eye contact today. Pt able to doff gown and don with Min A and worked on LB dressing to don socks only using tailor tech  and extended time to process MP and sequencing task seated EOB. Min HHA for sit to stand at EOB and to transfer to BSC. Pt sat at EOB stating she \"might need to pee\" . Oral hygiene and grooming  done seated on BSC with extended time to complete thoroughly with cues. Pt washed her face post set up  and stated, \"Now I feel so much \". Pt is limited to get back to base line due to impaired cognition, balance, general weakness and decreased activity tolerance needed to complete all ADL's, I ADL's  and ADL transfers. Pt requested to get BTB due to fatigue. Pt required Min A for BTB.  RN updated on OT treatment findings and recommendations. Will continue to follow.         Plan    Treatment Plan Status: (P) Continue Current Treatment Plan  Type of Treatment: Self Care / Activities of Daily Living, Therapeutic Activity, Neuro Re-Education / Balance  Treatment Frequency: 3 Times per Week  Treatment Duration: Until Therapy Goals Met    DC Equipment Recommendations: (P) Unable to determine at this time  Discharge Recommendations: (P) Recommend post-acute placement for additional occupational therapy services prior to discharge home    Subjective    \" I can sit here\". Pt referring to sitting EOB.      Objective       12/12/23 1143 "   Cognition    Cognition / Consciousness X   Speech/ Communication Delayed Responses   Orientation Level Not Oriented to Age;Not Oriented to Address;Not Oriented to Year;Not Oriented to Month;Not Oriented to Day;Not Oriented to Time;Not Oriented to Place;Not Oriented to Reason   Level of Consciousness Alert   Ability To Follow Commands 1 Step   Safety Awareness Impaired;Impulsive   New Learning Impaired   Attention Impaired   Sequencing Impaired   Initiation Impaired   Comments slow to respond and process instructions; pleasant and cooperative.   Passive ROM Upper Body   Comments WFL   Active ROM Upper Body   Active ROM Upper Body  X   Dominant Hand Right   Comments WFL for simple self care tasks.   Strength Upper Body   Upper Body Strength  X   Gross Strength Generalized Weakness, Equal Bilaterally.    Comments needs rest periods with simple self care tasks due to weakness.   Other Treatments   Other Treatments Provided Psychosocial intervention addressed. Worked on seated ADL's and ADL transfers.   Balance   Sitting Balance (Static) Fair   Sitting Balance (Dynamic) Fair   Standing Balance (Static) Fair -   Standing Balance (Dynamic) Fair -   Weight Shift Sitting Fair   Weight Shift Standing Fair   Skilled Intervention Verbal Cuing;Tactile Cuing;Sequencing;Postural Facilitation;Compensatory Strategies   Comments HHA for BSC transfers.   Bed Mobility    Supine to Sit Standby Assist   Sit to Supine Standby Assist   Scooting Contact Guard Assist   Skilled Intervention Verbal Cuing;Tactile Cuing;Sequencing;Compensatory Strategies   Comments HOB raised and use of bedrail   Activities of Daily Living   Grooming Minimal Assist;Seated  (oral hygiene and brushing hair, washing face with extended time.)   Bathing   (declined)   Upper Body Dressing Minimal Assist   Lower Body Dressing Minimal Assist  (seated EOB with cues for sequencing.)   Toileting   (pt did not urinate on BSC and had no need for BM at this time. Toilet  "hygiene not assessed.)   Skilled Intervention Verbal Cuing;Tactile Cuing;Sequencing;Compensatory Strategies   Comments Pt worked on seated ADL's and ADL transfers using HHA.   Functional Mobility   Sit to Stand Contact Guard Assist   Bed, Chair, Wheelchair Transfer Contact Guard Assist   Toilet Transfers Contact Guard Assist  (to/from BS)   Transfer Method Stand Step   Mobility EOB to/from [facility] with HHA   Skilled Intervention Verbal Cuing;Tactile Cuing;Sequencing;Postural Facilitation;Compensatory Strategies   Comments HHA   Activity Tolerance   Comments Pt fatigues easily and quickly.   Patient / Family Goals   Patient / Family Goal #1 \"will I ever get better\"   Short Term Goals   Short Term Goal # 1 pt will complete txf to BSC w/min A   Goal Outcome # 1 Progressing as expected   Short Term Goal # 2 pt will complete grooming seated w/spv   Goal Outcome # 2 Progressing as expected   Occupational Therapy Treatment Plan    O.T. Treatment Plan Continue Current Treatment Plan   Anticipated Discharge Equipment and Recommendations   DC Equipment Recommendations Unable to determine at this time   Discharge Recommendations Recommend post-acute placement for additional occupational therapy services prior to discharge home   Interdisciplinary Plan of Care Collaboration   IDT Collaboration with  Nursing;Other (See Comments)  (pt has a sitter)   Patient Position at End of Therapy In Bed;Bed Alarm On;Call Light within Reach;Tray Table within Reach;Phone within Reach;Other (Comments)  (sitter in room.)           " no

## 2024-01-31 NOTE — HISTORY OF PRESENT ILLNESS
[Home] : at home, [unfilled] , at the time of the visit. [Medical Office: ([facility])___] : at the medical office located in  [Verbal consent obtained from patient] : the patient, [unfilled] [de-identified] : History of chronic pain and now off Percocet and taking tramadol PRN\par History of insomnia- taking melatonin with relief. \par Lung nodules- needed CT of lungs Pt. seen and evaluated, chart, labs, meds reviewed. Patient is less guarded and less preoccupied with his physical symptoms, focused on his upcoming discharge and return to residence. He is in good spirits and pleasant throughout the interview. No SI/HI. Denies psychosis. No PRNs.     As per staff, patient does not represent a management problem.

## 2024-04-15 ENCOUNTER — APPOINTMENT (OUTPATIENT)
Dept: URBAN - METROPOLITAN AREA CLINIC 294 | Age: 74
Setting detail: DERMATOLOGY
End: 2024-04-17

## 2024-04-15 DIAGNOSIS — L82.1 OTHER SEBORRHEIC KERATOSIS: ICD-10-CM

## 2024-04-15 DIAGNOSIS — B35.1 TINEA UNGUIUM: ICD-10-CM

## 2024-04-15 DIAGNOSIS — L71.8 OTHER ROSACEA: ICD-10-CM

## 2024-04-15 DIAGNOSIS — L81.4 OTHER MELANIN HYPERPIGMENTATION: ICD-10-CM

## 2024-04-15 DIAGNOSIS — L85.3 XEROSIS CUTIS: ICD-10-CM

## 2024-04-15 DIAGNOSIS — D22 MELANOCYTIC NEVI: ICD-10-CM

## 2024-04-15 DIAGNOSIS — L30.4 ERYTHEMA INTERTRIGO: ICD-10-CM

## 2024-04-15 DIAGNOSIS — D18.0 HEMANGIOMA: ICD-10-CM

## 2024-04-15 DIAGNOSIS — A49.02 METHICILLIN RESISTANT STAPHYLOCOCCUS AUREUS INFECTION, UNSPECIFIED SITE: ICD-10-CM

## 2024-04-15 PROBLEM — D18.01 HEMANGIOMA OF SKIN AND SUBCUTANEOUS TISSUE: Status: ACTIVE | Noted: 2024-04-15

## 2024-04-15 PROBLEM — D22.71 MELANOCYTIC NEVI OF RIGHT LOWER LIMB, INCLUDING HIP: Status: ACTIVE | Noted: 2024-04-15

## 2024-04-15 PROBLEM — D22.5 MELANOCYTIC NEVI OF TRUNK: Status: ACTIVE | Noted: 2024-04-15

## 2024-04-15 PROCEDURE — 99203 OFFICE O/P NEW LOW 30 MIN: CPT

## 2024-04-15 PROCEDURE — OTHER OTC TREATMENT REGIMEN: OTHER

## 2024-04-15 PROCEDURE — OTHER SUNSCREEN RECOMMENDATIONS: OTHER

## 2024-04-15 PROCEDURE — OTHER ADDITIONAL NOTES: OTHER

## 2024-04-15 PROCEDURE — OTHER MIPS QUALITY: OTHER

## 2024-04-15 PROCEDURE — OTHER PRESCRIPTION MEDICATION MANAGEMENT: OTHER

## 2024-04-15 PROCEDURE — OTHER PRESCRIPTION: OTHER

## 2024-04-15 PROCEDURE — OTHER COUNSELING: OTHER

## 2024-04-15 RX ORDER — MUPIROCIN 20 MG/G
OINTMENT TOPICAL
Qty: 22 | Refills: 0 | Status: ERX | COMMUNITY
Start: 2024-04-15

## 2024-04-15 RX ORDER — METRONIDAZOLE 7.5 MG/G
GEL TOPICAL BID
Qty: 45 | Refills: 6 | Status: ERX | COMMUNITY
Start: 2024-04-15

## 2024-04-15 ASSESSMENT — LOCATION DETAILED DESCRIPTION DERM
LOCATION DETAILED: RIGHT MEDIAL BREAST 5-6:00 REGION
LOCATION DETAILED: SUPERIOR THORACIC SPINE
LOCATION DETAILED: RIGHT MEDIAL UPPER BACK
LOCATION DETAILED: RIGHT MEDIAL PLANTAR 3RD TOE
LOCATION DETAILED: INFERIOR MID FOREHEAD
LOCATION DETAILED: LEFT GREAT TOENAIL
LOCATION DETAILED: RIGHT INFERIOR MEDIAL MALAR CHEEK
LOCATION DETAILED: RIGHT MID-UPPER BACK
LOCATION DETAILED: LEFT MEDIAL BREAST 7-8:00 REGION

## 2024-04-15 ASSESSMENT — PAIN INTENSITY VAS: HOW INTENSE IS YOUR PAIN 0 BEING NO PAIN, 10 BEING THE MOST SEVERE PAIN POSSIBLE?: NO PAIN

## 2024-04-15 ASSESSMENT — LOCATION ZONE DERM
LOCATION ZONE: TOE
LOCATION ZONE: TOENAIL
LOCATION ZONE: FACE
LOCATION ZONE: TRUNK

## 2024-04-15 ASSESSMENT — SEVERITY ASSESSMENT: SEVERITY: CLEAR

## 2024-04-15 ASSESSMENT — LOCATION SIMPLE DESCRIPTION DERM
LOCATION SIMPLE: INFERIOR FOREHEAD
LOCATION SIMPLE: UPPER BACK
LOCATION SIMPLE: RIGHT UPPER BACK
LOCATION SIMPLE: RIGHT BREAST
LOCATION SIMPLE: LEFT GREAT TOE
LOCATION SIMPLE: PLANTAR SURFACE OF RIGHT 3RD TOE
LOCATION SIMPLE: LEFT BREAST
LOCATION SIMPLE: RIGHT CHEEK

## 2024-04-15 ASSESSMENT — SEVERITY ASSESSMENT OVERALL AMONG ALL PATIENTS
IN YOUR EXPERIENCE, AMONG ALL PATIENTS YOU HAVE SEEN WITH THIS CONDITION, HOW SEVERE IS THIS PATIENT'S CONDITION?: MINIMAL

## 2024-04-15 NOTE — PROCEDURE: PRESCRIPTION MEDICATION MANAGEMENT
Render In Strict Bullet Format?: No
Detail Level: Zone
Initiate Treatment: mupirocin 2 % topical ointment : Apply to affected area as needed for breakout. Can use twice daily for up to 5 days.
Initiate Treatment: metronidazole 0.75 % topical gel mApply to rosacea on face twice a day\\n\\nPatient can continue retinol moisturizer

## 2024-04-15 NOTE — HPI: RASH
Is The Patient Presenting As Previously Scheduled?: Yes
How Severe Is Your Rash?: mild
Is This A New Presentation, Or A Follow-Up?: Rash
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

## 2024-04-15 NOTE — PROCEDURE: ADDITIONAL NOTES
Detail Level: Simple
Render Risk Assessment In Note?: no
Additional Notes: Podiatrist removed nail and patient is awaiting results of biopsy. Advised she can use OTC antifungal.

## 2024-04-15 NOTE — PROCEDURE: OTC TREATMENT REGIMEN
Detail Level: Zone
Patient Specific Otc Recommendations (Will Not Stick From Patient To Patient): Patient advised to use dove soap and moisturizer
Patient Specific Otc Recommendations (Will Not Stick From Patient To Patient): Recommend gold bond powder

## 2024-05-14 ENCOUNTER — APPOINTMENT (OUTPATIENT)
Dept: FAMILY MEDICINE | Facility: CLINIC | Age: 74
End: 2024-05-14

## 2024-05-19 NOTE — PHYSICAL EXAM
[Fully active, able to carry on all pre-disease performance without restriction] : Status 0 - Fully active, able to carry on all pre-disease performance without restriction [Normal] : affect appropriate no deformity

## 2024-08-05 ENCOUNTER — APPOINTMENT (OUTPATIENT)
Dept: URBAN - METROPOLITAN AREA CLINIC 294 | Age: 74
Setting detail: DERMATOLOGY
End: 2024-08-07

## 2024-08-05 DIAGNOSIS — D49.2 NEOPLASM OF UNSPECIFIED BEHAVIOR OF BONE, SOFT TISSUE, AND SKIN: ICD-10-CM

## 2024-08-05 DIAGNOSIS — A49.02 METHICILLIN RESISTANT STAPHYLOCOCCUS AUREUS INFECTION, UNSPECIFIED SITE: ICD-10-CM

## 2024-08-05 PROCEDURE — OTHER MIPS QUALITY: OTHER

## 2024-08-05 PROCEDURE — 99213 OFFICE O/P EST LOW 20 MIN: CPT | Mod: 25

## 2024-08-05 PROCEDURE — OTHER COUNSELING: OTHER

## 2024-08-05 PROCEDURE — OTHER PRESCRIPTION MEDICATION MANAGEMENT: OTHER

## 2024-08-05 PROCEDURE — OTHER TREATMENT REGIMEN: OTHER

## 2024-08-05 PROCEDURE — 11102 TANGNTL BX SKIN SINGLE LES: CPT

## 2024-08-05 PROCEDURE — OTHER BIOPSY BY SHAVE METHOD: OTHER

## 2024-08-05 ASSESSMENT — LOCATION ZONE DERM
LOCATION ZONE: TRUNK
LOCATION ZONE: FACE

## 2024-08-05 ASSESSMENT — LOCATION SIMPLE DESCRIPTION DERM
LOCATION SIMPLE: ABDOMEN
LOCATION SIMPLE: INFERIOR FOREHEAD

## 2024-08-05 ASSESSMENT — LOCATION DETAILED DESCRIPTION DERM
LOCATION DETAILED: SUBXIPHOID
LOCATION DETAILED: INFERIOR MID FOREHEAD

## 2024-08-05 NOTE — PROCEDURE: PRESCRIPTION MEDICATION MANAGEMENT
Render In Strict Bullet Format?: No
Detail Level: Zone
Plan: Patient can call for Clindamycin 300mg tid x 10 days for flare ups
Discontinue Regimen: mupirocin 2 % topical ointment : Apply to affected area as needed for breakout. Can use twice daily for up to 5 days.

## 2024-08-05 NOTE — PROCEDURE: BIOPSY BY SHAVE METHOD
Detail Level: Detailed
Depth Of Biopsy: dermis
Was A Bandage Applied: Yes
Size Of Lesion In Cm: 0.7
X Size Of Lesion In Cm: 0
Biopsy Type: H and E
Biopsy Method: Dermablade
Anesthesia Type: 1% lidocaine with epinephrine
Anesthesia Volume In Cc: 0.5
Hemostasis: Drysol
Wound Care: Petrolatum
Dressing: bandage
Destruction After The Procedure: No
Type Of Destruction Used: Curettage
Curettage Text: The wound bed was treated with curettage after the biopsy was performed.
Cryotherapy Text: The wound bed was treated with cryotherapy after the biopsy was performed.
Electrodesiccation Text: The wound bed was treated with electrodesiccation after the biopsy was performed.
Electrodesiccation And Curettage Text: The wound bed was treated with electrodesiccation and curettage after the biopsy was performed.
Silver Nitrate Text: The wound bed was treated with silver nitrate after the biopsy was performed.
Lab: [id number]
Consent: Written consent was obtained and risks were reviewed including but not limited to scarring, infection, bleeding, scabbing, incomplete removal, nerve damage and allergy to anesthesia.
Post-Care Instructions: I reviewed with the patient in detail post-care instructions. Patient is to keep the biopsy site dry overnight, and then apply bacitracin twice daily until healed. Patient may apply hydrogen peroxide soaks to remove any crusting.
Notification Instructions: Patient will be notified of biopsy results. However, patient instructed to call the office if not contacted within 2 weeks.
Billing Type: Third-Party Bill
Information: Selecting Yes will display possible errors in your note based on the variables you have selected. This validation is only offered as a suggestion for you. PLEASE NOTE THAT THE VALIDATION TEXT WILL BE REMOVED WHEN YOU FINALIZE YOUR NOTE. IF YOU WANT TO FAX A PRELIMINARY NOTE YOU WILL NEED TO TOGGLE THIS TO 'NO' IF YOU DO NOT WANT IT IN YOUR FAXED NOTE.

## 2025-05-22 ENCOUNTER — APPOINTMENT (OUTPATIENT)
Dept: URBAN - METROPOLITAN AREA CLINIC 294 | Age: 75
Setting detail: DERMATOLOGY
End: 2025-05-27

## 2025-05-22 DIAGNOSIS — L71.8 OTHER ROSACEA: ICD-10-CM

## 2025-05-22 DIAGNOSIS — D22 MELANOCYTIC NEVI: ICD-10-CM

## 2025-05-22 DIAGNOSIS — L21.8 OTHER SEBORRHEIC DERMATITIS: ICD-10-CM

## 2025-05-22 DIAGNOSIS — L82.1 OTHER SEBORRHEIC KERATOSIS: ICD-10-CM

## 2025-05-22 DIAGNOSIS — D18.0 HEMANGIOMA: ICD-10-CM

## 2025-05-22 DIAGNOSIS — L81.4 OTHER MELANIN HYPERPIGMENTATION: ICD-10-CM

## 2025-05-22 PROBLEM — D22.5 MELANOCYTIC NEVI OF TRUNK: Status: ACTIVE | Noted: 2025-05-22

## 2025-05-22 PROBLEM — D18.01 HEMANGIOMA OF SKIN AND SUBCUTANEOUS TISSUE: Status: ACTIVE | Noted: 2025-05-22

## 2025-05-22 PROCEDURE — OTHER MIPS QUALITY: OTHER

## 2025-05-22 PROCEDURE — OTHER PRESCRIPTION: OTHER

## 2025-05-22 PROCEDURE — 99214 OFFICE O/P EST MOD 30 MIN: CPT

## 2025-05-22 PROCEDURE — OTHER PRESCRIPTION MEDICATION MANAGEMENT: OTHER

## 2025-05-22 PROCEDURE — OTHER SUNSCREEN RECOMMENDATIONS: OTHER

## 2025-05-22 PROCEDURE — OTHER COUNSELING: OTHER

## 2025-05-22 RX ORDER — KETOCONAZOLE 20 MG/G
CREAM TOPICAL BID
Qty: 60 | Refills: 2 | Status: ERX | COMMUNITY
Start: 2025-05-22

## 2025-05-22 ASSESSMENT — LOCATION DETAILED DESCRIPTION DERM
LOCATION DETAILED: SUPERIOR THORACIC SPINE
LOCATION DETAILED: RIGHT MID-UPPER BACK
LOCATION DETAILED: RIGHT MEDIAL UPPER BACK
LOCATION DETAILED: RIGHT INFERIOR MEDIAL MALAR CHEEK
LOCATION DETAILED: LEFT INFERIOR NASAL CHEEK

## 2025-05-22 ASSESSMENT — LOCATION ZONE DERM
LOCATION ZONE: TRUNK
LOCATION ZONE: FACE

## 2025-05-22 ASSESSMENT — LOCATION SIMPLE DESCRIPTION DERM
LOCATION SIMPLE: RIGHT CHEEK
LOCATION SIMPLE: RIGHT UPPER BACK
LOCATION SIMPLE: UPPER BACK
LOCATION SIMPLE: LEFT CHEEK